# Patient Record
Sex: FEMALE | Race: WHITE | NOT HISPANIC OR LATINO | Employment: OTHER | URBAN - METROPOLITAN AREA
[De-identification: names, ages, dates, MRNs, and addresses within clinical notes are randomized per-mention and may not be internally consistent; named-entity substitution may affect disease eponyms.]

---

## 2024-07-30 ENCOUNTER — OFFICE VISIT (OUTPATIENT)
Dept: ORTHOPEDIC SURGERY | Facility: CLINIC | Age: 54
End: 2024-07-30
Payer: COMMERCIAL

## 2024-07-30 DIAGNOSIS — M16.11 PRIMARY OSTEOARTHRITIS OF RIGHT HIP: Primary | ICD-10-CM

## 2024-07-30 DIAGNOSIS — M25.551 RIGHT HIP PAIN: Primary | ICD-10-CM

## 2024-07-30 PROCEDURE — 99213 OFFICE O/P EST LOW 20 MIN: CPT | Performed by: ORTHOPAEDIC SURGERY

## 2024-07-30 RX ORDER — ACETAMINOPHEN 500 MG
1 TABLET ORAL
COMMUNITY
Start: 2024-01-24

## 2024-07-30 RX ORDER — BISACODYL 5 MG/1
1 TABLET, COATED ORAL
COMMUNITY

## 2024-07-30 RX ORDER — MELOXICAM 15 MG/1
1 TABLET ORAL
COMMUNITY
Start: 2024-02-19

## 2024-07-30 RX ORDER — FLUTICASONE PROPIONATE 50 MCG
2 SPRAY, SUSPENSION (ML) NASAL
COMMUNITY
Start: 2023-09-13

## 2024-07-30 RX ORDER — CELECOXIB 200 MG/1
200 CAPSULE ORAL 2 TIMES DAILY
Qty: 60 CAPSULE | Refills: 0 | Status: SHIPPED | OUTPATIENT
Start: 2024-07-30 | End: 2024-08-29

## 2024-07-30 RX ORDER — METOCLOPRAMIDE 10 MG/1
TABLET ORAL
COMMUNITY
Start: 2023-11-18

## 2024-07-30 RX ORDER — LEVOTHYROXINE SODIUM 112 UG/1
112 TABLET ORAL
COMMUNITY
Start: 2024-02-22

## 2024-07-30 RX ORDER — DICLOFENAC SODIUM 75 MG/1
75 TABLET, DELAYED RELEASE ORAL
COMMUNITY
Start: 2024-04-03

## 2024-07-30 RX ORDER — ESCITALOPRAM OXALATE 5 MG/1
7.5 TABLET ORAL
COMMUNITY
Start: 2023-11-21

## 2024-07-30 NOTE — PROGRESS NOTES
History of Present Illness  No chief complaint on file.      Patient presents with {side:94680} hip pain for several years.  It has been worsening over the past  {months:85299} months.  The patient localizes the pain predominantly in the {hip location:54944}.  Recently there has been concern for falls and instability.  There is increasing difficulty with activities of daily living and significant disability related to the hip pain.  The patient endorses the following failed non-operative treatments: {treatment options:61033}.   There is increasing frustration with persistent pain and discomfort and decreasing distance of ambulation.    Pain is described as {pain description:08355}.  Better with rest, worse with activity.   Pain level: {PAIN SCALE NUMBERS:29672}  Assistive device: {amb assistive device:15683}  History of surgery on the hip: ***  Back pain: {YES (DEF)/NO:63086}  Numbness or tingling radiating to the lower extremities: {YES (DEF)/NO:96791}    No past medical history on file.    Medication Documentation Review Audit    **Prior to Admission medications have not yet been reviewed**         Not on File    Social History     Socioeconomic History    Marital status:      Spouse name: Not on file    Number of children: Not on file    Years of education: Not on file    Highest education level: Not on file   Occupational History    Not on file   Tobacco Use    Smoking status: Not on file    Smokeless tobacco: Not on file   Substance and Sexual Activity    Alcohol use: Not on file    Drug use: Not on file    Sexual activity: Not on file   Other Topics Concern    Not on file   Social History Narrative    Not on file     Social Determinants of Health     Financial Resource Strain: Low Risk  (4/2/2023)    Received from [facility]    Overall Financial Resource Strain (CARDIA)     Difficulty of Paying Living Expenses: Not hard at all   Food Insecurity: No Food Insecurity (4/2/2023)    Received from  [facility]    Hunger Vital Sign     Worried About Running Out of Food in the Last Year: Never true     Ran Out of Food in the Last Year: Never true   Transportation Needs: No Transportation Needs (4/2/2023)    Received from [facility]    PRAPARE - Transportation     Lack of Transportation (Medical): No     Lack of Transportation (Non-Medical): No   Physical Activity: Insufficiently Active (4/2/2023)    Received from [facility]    Exercise Vital Sign     Days of Exercise per Week: 3 days     Minutes of Exercise per Session: 20 min   Stress: No Stress Concern Present (4/2/2023)    Received from [facility]    [place] of [employer]ational [employer] - Occupational Stress Questionnaire     Feeling of Stress : Only a little   Social Connections: Moderately Integrated (4/2/2023)    Received from [facility]    Social Connection and Isolation Panel [NHANES]     Frequency of Communication with Friends and Family: Twice a week     Frequency of Social Gatherings with Friends and Family: Once a week     Attends Jew Services: Never     Active Member of Clubs or Organizations: Yes     Attends Club or Organization Meetings: More than 4 times per year     Marital Status:    Intimate Partner Violence: Not on file   Housing Stability: Not on file       No past surgical history on file.    No images are attached to the encounter.    BMI  ***       Review of Systems   GENERAL: Negative for malaise, significant weight loss, fever  MUSCULOSKELETAL: see HPI  NEURO:  Negative     Exam  {side:73159} Hip:  Antalgic gait  Negative Trendelenburg sign  Skin healthy and intact  No tenderness to palpation over lumbar spine  Minimal tenderness over greater trochanter     Range of motion:  Flexion: {hip flexion:53759} internal rotation: {hip internal rotation:04756} external rotation: {hip external rotation:95088} abduction: {hip abduction:47670}  Pain with: {HIP EXAM POSITIVES:19309}  No weakness with  resisted hip flexion, abduction or adduction     Negative straight leg raise  Neurovascular exam normal distally     Radiographs  XR hip w pelvis  Interpreted By:  DIDI HORTON MD  MRN: [id number]  Patient Name: SERGIO LANE     STUDY:  HIP, UNILATERAL W/PELVIS WHEN PERFORMED 2-3 VIEWS;  Right;  4/4/2023  10:55 am     INDICATION:  pain  M25.551: Pain of right hip joint.     ACCESSION NUMBER(S):  [id number]     ORDERING CLINICIAN:  DIDI HORTON     FINDINGS:  Right hip films are negative fracture, dislocation or destructive  lesion. There is moderate degenerative arthrosis seen with joint  space narrowing and spurring. There is severe degenerative change  seen at L5-S1            Assessment  Osteoarthrosis {side:61147} hip     Plan  We discussed with the patient the diagnosis of {MILD, MOD, SEV:07231} degenerative joint disease of the hip.  We reviewed an evidence-based approach to osteoarthritis of the hip.  We strongly encouraged low-impact aerobic activity and non-opioid analgesics.  We discussed the role of physical therapy to aid in strengthening and gait training.  We discussed temporary pain relief with corticosteroid injections and the associated risks.      The patient elected for ***.

## 2024-07-31 NOTE — PROGRESS NOTES
Chief Complaint   Patient presents with    Right Hip - Follow-up       The patient is here for follow-up of their side: right hip arthritis.  They complain of moderate hip pain.  Thus far the patient has tried NSAIDS and Injections.  The patient denies any recent trauma.  The patient denies any back pain, numbness or tingling in the lower extremity.    History reviewed. No pertinent past medical history.    Medication Documentation Review Audit       Reviewed by Victoria Nolan on 07/30/24 at 1031      Medication Order Taking? Sig Documenting Provider Last Dose Status   diclofenac (Voltaren) 75 mg EC tablet [id number] Yes Take 1 tablet (75 mg) by mouth 2 times daily (morning and late afternoon). Historical Provider, MD  Active   escitalopram (Lexapro) 5 mg tablet [id number] Yes Take 1.5 tablets (7.5 mg) by mouth once daily. Historical Provider, MD  Active   fluticasone (Flonase) 50 mcg/actuation nasal spray [id number] Yes 2 sprays by Does not apply route once daily. Historical Provider, MD  Active   levothyroxine (Synthroid, Levoxyl) 112 mcg tablet [id number] Yes Take 1 tablet (112 mcg) by mouth once daily. Historical Provider, MD  Active   meloxicam (Mobic) 15 mg tablet [id number] Yes Take 1 tablet (15 mg) by mouth early in the morning.. Historical Provider, MD  Active   metoclopramide (Reglan) 10 mg tablet [id number] Yes Take 1 tablet by mouth every 6 hours as needed (headaches) for up to 7 days. Historical Provider, MD  Active   multivitamin with minerals iron-free (Multivitamin 50 Plus) [id number]  Take 1 tablet by mouth once daily. Historical Provider, MD  Active                    No Known Allergies    Social History     Socioeconomic History    Marital status:      Spouse name: Not on file    Number of children: Not on file    Years of education: Not on file    Highest education level: Not on file   Occupational History    Not on file   Tobacco Use    Smoking status: Former     Types: Cigarettes      Passive exposure: Past    Smokeless tobacco: Never   Substance and Sexual Activity    Alcohol use: Not on file    Drug use: Not on file    Sexual activity: Not on file   Other Topics Concern    Not on file   Social History Narrative    Not on file     Social Determinants of Health     Financial Resource Strain: Low Risk  (4/2/2023)    Received from [facility]    Overall Financial Resource Strain (CARDIA)     Difficulty of Paying Living Expenses: Not hard at all   Food Insecurity: No Food Insecurity (4/2/2023)    Received from [facility]    Hunger Vital Sign     Worried About Running Out of Food in the Last Year: Never true     Ran Out of Food in the Last Year: Never true   Transportation Needs: No Transportation Needs (4/2/2023)    Received from [facility]    PRAPARE - Transportation     Lack of Transportation (Medical): No     Lack of Transportation (Non-Medical): No   Physical Activity: Insufficiently Active (4/2/2023)    Received from [facility]    Exercise Vital Sign     Days of Exercise per Week: 3 days     Minutes of Exercise per Session: 20 min   Stress: No Stress Concern Present (4/2/2023)    Received from [facility]    [place] [employer] of Occupational Health - Occupational Stress Questionnaire     Feeling of Stress : Only a little   Social Connections: Moderately Integrated (4/2/2023)    Received from [facility]    Social Connection and Isolation Panel [NHANES]     Frequency of Communication with Friends and Family: Twice a week     Frequency of Social Gatherings with Friends and Family: Once a week     Attends Congregational Services: Never     Active Member of Clubs or Organizations: Yes     Attends Club or Organization Meetings: More than 4 times per year     Marital Status:    Intimate Partner Violence: Not on file   Housing Stability: Not on file       History reviewed. No pertinent surgical history.        Physical examination side: right hip:    There is  moderate pain with hip flexion, internal and external rotation.  The patient has intact hip flexion and hip abduction.  Hip range of motion:  Flexion: 120  Internal rotation: 10  External rotation: 20  Abduction: 30  The patient is distally neurovascularly intact    Imaging:  XR hip w pelvis  Interpreted By:  DIDI HORTON MD  MRN: [id number]  Patient Name: SERGIO LANE     STUDY:  HIP, UNILATERAL W/PELVIS WHEN PERFORMED 2-3 VIEWS;  Right;  4/4/2023  10:55 am     INDICATION:  pain  M25.551: Pain of right hip joint.     ACCESSION NUMBER(S):  [id number]     ORDERING CLINICIAN:  DIDI HORTON     FINDINGS:  Right hip films are negative fracture, dislocation or destructive  lesion. There is moderate degenerative arthrosis seen with joint  space narrowing and spurring. There is severe degenerative change  seen at L5-S1         Impression:  Osteoarthrosis side: right hip    Plan:  NSAIDS  I had a lengthy discussion with the patient about her options including hip replacement.  At this point she is not yet ready.  She has tried multiple anti-inflammatory medications without success.  She has had a corticosteroid injection intra-articularly which did not work well.  We will try Celebrex.  This was sent to her pharmacy.  Follow-up as needed  All questions were answered

## 2024-08-01 ENCOUNTER — APPOINTMENT (OUTPATIENT)
Dept: ORTHOPEDIC SURGERY | Facility: CLINIC | Age: 54
End: 2024-08-01
Payer: COMMERCIAL

## 2024-08-05 DIAGNOSIS — M16.11 PRIMARY OSTEOARTHRITIS OF RIGHT HIP: ICD-10-CM

## 2024-08-05 NOTE — TELEPHONE ENCOUNTER
Patient called and stated the Celebrex was helping, however it was starting to bother her stomach.  She is questing Celebrex 100mg to see if she tolerates it better.  Pharm verified. RX sent for review.

## 2024-08-06 RX ORDER — CELECOXIB 100 MG/1
100 CAPSULE ORAL 2 TIMES DAILY
Qty: 60 CAPSULE | Refills: 0 | Status: SHIPPED | OUTPATIENT
Start: 2024-08-06 | End: 2024-09-05

## 2024-09-09 DIAGNOSIS — M16.11 PRIMARY OSTEOARTHRITIS OF RIGHT HIP: ICD-10-CM

## 2024-09-09 RX ORDER — CELECOXIB 100 MG/1
100 CAPSULE ORAL 2 TIMES DAILY
Qty: 120 CAPSULE | Refills: 0 | Status: SHIPPED | OUTPATIENT
Start: 2024-09-09 | End: 2024-11-08

## 2024-09-30 NOTE — PROGRESS NOTES
No chief complaint on file.      The patient is here for follow-up of their side: right hip arthritis.  They complain of mild hip pain.  Thus far the patient has tried NSAIDS.  The patient denies any recent trauma.  The patient denies any back pain, numbness or tingling in the lower extremity.  She notes improvements in her hip pain with taking Celebrex.    No past medical history on file.    Medication Documentation Review Audit       Reviewed by Victoria Nolan on 07/30/24 at 1031      Medication Order Taking? Sig Documenting Provider Last Dose Status   diclofenac (Voltaren) 75 mg EC tablet [id number] Yes Take 1 tablet (75 mg) by mouth 2 times daily (morning and late afternoon). Historical Provider, MD  Active   escitalopram (Lexapro) 5 mg tablet [id number] Yes Take 1.5 tablets (7.5 mg) by mouth once daily. Historical Provider, MD  Active   fluticasone (Flonase) 50 mcg/actuation nasal spray [id number] Yes 2 sprays by Does not apply route once daily. Historical Provider, MD  Active   levothyroxine (Synthroid, Levoxyl) 112 mcg tablet [id number] Yes Take 1 tablet (112 mcg) by mouth once daily. Historical Provider, MD  Active   meloxicam (Mobic) 15 mg tablet [id number] Yes Take 1 tablet (15 mg) by mouth early in the morning.. Historical Provider, MD  Active   metoclopramide (Reglan) 10 mg tablet [id number] Yes Take 1 tablet by mouth every 6 hours as needed (headaches) for up to 7 days. Historical Provider, MD  Active   multivitamin with minerals iron-free (Multivitamin 50 Plus) [id number]  Take 1 tablet by mouth once daily. Historical Provider, MD  Active                    No Known Allergies    Social History     Socioeconomic History    Marital status:      Spouse name: Not on file    Number of children: Not on file    Years of education: Not on file    Highest education level: Not on file   Occupational History    Not on file   Tobacco Use    Smoking status: Former     Types: Cigarettes     Passive exposure:  Past    Smokeless tobacco: Never   Substance and Sexual Activity    Alcohol use: Not on file    Drug use: Not on file    Sexual activity: Not on file   Other Topics Concern    Not on file   Social History Narrative    Not on file     Social Determinants of Health     Financial Resource Strain: Low Risk  (4/2/2023)    Received from [facility]    Overall Financial Resource Strain (CARDIA)     Difficulty of Paying Living Expenses: Not hard at all   Food Insecurity: No Food Insecurity (4/2/2023)    Received from [facility]    Hunger Vital Sign     Worried About Running Out of Food in the Last Year: Never true     Ran Out of Food in the Last Year: Never true   Transportation Needs: No Transportation Needs (4/2/2023)    Received from [facility]    PRAPARE - Transportation     Lack of Transportation (Medical): No     Lack of Transportation (Non-Medical): No   Physical Activity: Insufficiently Active (4/2/2023)    Received from [facility]    Exercise Vital Sign     Days of Exercise per Week: 3 days     Minutes of Exercise per Session: 20 min   Stress: No Stress Concern Present (4/2/2023)    Received from [facility]    [place] of Occupational Health - Occupational Stress Questionnaire     Feeling of Stress : Only a little   Social Connections: Moderately Integrated (4/2/2023)    Received from [facility]    Social Connection and Isolation Panel [NHANES]     Frequency of Communication with Friends and Family: Twice a week     Frequency of Social Gatherings with Friends and Family: Once a week     Attends Judaism Services: Never     Active Member of Clubs or Organizations: Yes     Attends Club or Organization Meetings: More than 4 times per year     Marital Status:    Intimate Partner Violence: Not on file   Housing Stability: Not on file       No past surgical history on file.    BMI  31    Physical examination side: right hip:    There is mild pain with hip flexion,  internal and external rotation.  The patient has intact hip flexion and hip abduction.  Hip range of motion:  Flexion: 120  Internal rotation: 10  External rotation: 20  Abduction: 20  The patient is distally neurovascularly intact    Imaging:  XR hip w pelvis  Interpreted By:  DIDI HORTON MD  MRN: [id number]  Patient Name: SERGIO LANE     STUDY:  HIP, UNILATERAL W/PELVIS WHEN PERFORMED 2-3 VIEWS;  Right;  4/4/2023  10:55 am     INDICATION:  pain  M25.551: Pain of right hip joint.     ACCESSION NUMBER(S):  [id number]     ORDERING CLINICIAN:  DIDI HORTON     FINDINGS:  Right hip films are negative fracture, dislocation or destructive  lesion. There is moderate degenerative arthrosis seen with joint  space narrowing and spurring. There is severe degenerative change  seen at L5-S1         Impression:  Osteoarthrosis side: right hip    Plan:  Continue Celebrex, she will get refills through her primary care physician if needed.  If her hip pain becomes unbearable between refills of Celebrex she will call our office to be set up for an ultrasound-guided right hip intra-articular corticosteroid injection.  All questions answered

## 2024-10-01 ENCOUNTER — APPOINTMENT (OUTPATIENT)
Dept: ORTHOPEDIC SURGERY | Facility: CLINIC | Age: 54
End: 2024-10-01
Payer: COMMERCIAL

## 2024-10-01 VITALS — HEIGHT: 67 IN | BODY MASS INDEX: 31.23 KG/M2 | WEIGHT: 199 LBS

## 2024-10-01 DIAGNOSIS — M16.11 PRIMARY OSTEOARTHRITIS OF RIGHT HIP: Primary | ICD-10-CM

## 2024-10-01 PROCEDURE — 99213 OFFICE O/P EST LOW 20 MIN: CPT | Performed by: ORTHOPAEDIC SURGERY

## 2024-10-01 PROCEDURE — 3008F BODY MASS INDEX DOCD: CPT | Performed by: ORTHOPAEDIC SURGERY

## 2024-10-01 PROCEDURE — 1036F TOBACCO NON-USER: CPT | Performed by: ORTHOPAEDIC SURGERY

## 2024-10-01 RX ORDER — CELECOXIB 200 MG/1
200 CAPSULE ORAL DAILY
Qty: 60 CAPSULE | Refills: 3 | Status: SHIPPED | OUTPATIENT
Start: 2024-10-01 | End: 2025-05-29

## 2024-11-12 DIAGNOSIS — M16.11 PRIMARY OSTEOARTHRITIS OF RIGHT HIP: ICD-10-CM

## 2024-11-12 NOTE — TELEPHONE ENCOUNTER
Patient called and requested a refill for the Celebrex 100mg, BID.  She has a follow up in Jan 2025.  RX pended for review.

## 2024-11-19 RX ORDER — CELECOXIB 100 MG/1
100 CAPSULE ORAL 2 TIMES DAILY
Qty: 120 CAPSULE | Refills: 0 | Status: SHIPPED | OUTPATIENT
Start: 2024-11-19 | End: 2025-01-18

## 2024-11-26 ENCOUNTER — HOSPITAL ENCOUNTER (OUTPATIENT)
Dept: RADIOLOGY | Facility: HOSPITAL | Age: 54
Discharge: HOME | End: 2024-11-26
Payer: COMMERCIAL

## 2024-11-26 ENCOUNTER — APPOINTMENT (OUTPATIENT)
Dept: ORTHOPEDIC SURGERY | Facility: CLINIC | Age: 54
End: 2024-11-26
Payer: COMMERCIAL

## 2024-11-26 DIAGNOSIS — M25.551 PAIN OF RIGHT HIP: ICD-10-CM

## 2024-11-26 PROCEDURE — 73502 X-RAY EXAM HIP UNI 2-3 VIEWS: CPT | Mod: RIGHT SIDE | Performed by: RADIOLOGY

## 2024-11-26 PROCEDURE — 99214 OFFICE O/P EST MOD 30 MIN: CPT | Performed by: ORTHOPAEDIC SURGERY

## 2024-11-26 PROCEDURE — 73502 X-RAY EXAM HIP UNI 2-3 VIEWS: CPT | Mod: RT

## 2024-11-26 NOTE — PROGRESS NOTES
No chief complaint on file.      The patient is here for follow-up of their side: right hip arthritis.  They complain of severe hip pain.  Thus far the patient has tried NSAIDS and Injections.  The patient denies any recent trauma.  The patient denies any back pain, numbness or tingling in the lower extremity.    No past medical history on file.    Medication Documentation Review Audit       Reviewed by Victoria Nolan on 10/01/24 at 1010      Medication Order Taking? Sig Documenting Provider Last Dose Status   celecoxib (CeleBREX) 100 mg capsule [id number]  Take 1 capsule (100 mg) by mouth 2 times a day. Rey Ivey MD  Active   cholecalciferol (Vitamin D-3) 50 mcg (2,000 unit) capsule [id number]  Take 1 capsule (50 mcg) by mouth once daily with breakfast. Historical Provider, MD  Active   diclofenac (Voltaren) 75 mg EC tablet [id number]  Take 1 tablet (75 mg) by mouth 2 times daily (morning and late afternoon). Historical Provider, MD  Active   escitalopram (Lexapro) 5 mg tablet [id number]  Take 1.5 tablets (7.5 mg) by mouth once daily. Historical Provider, MD  Active   fluticasone (Flonase) 50 mcg/actuation nasal spray [id number]  2 sprays by Does not apply route once daily. Historical Provider, MD  Active   levothyroxine (Synthroid, Levoxyl) 112 mcg tablet [id number]  Take 1 tablet (112 mcg) by mouth once daily. Historical Provider, MD  Active   meloxicam (Mobic) 15 mg tablet [id number]  Take 1 tablet (15 mg) by mouth early in the morning.. Historical Provider, MD  Active   metoclopramide (Reglan) 10 mg tablet [id number]  Take 1 tablet by mouth every 6 hours as needed (headaches) for up to 7 days. Historical Provider, MD  Active   multivitamin with minerals iron-free (Multivitamin 50 Plus) [id number]  Take 1 tablet by mouth once daily. Historical Provider, MD  Active                    No Known Allergies    Social History     Socioeconomic History    Marital status:      Spouse name: Not on file    Number of  children: Not on file    Years of education: Not on file    Highest education level: Not on file   Occupational History    Not on file   Tobacco Use    Smoking status: Former     Types: Cigarettes     Passive exposure: Past    Smokeless tobacco: Never   Substance and Sexual Activity    Alcohol use: Not on file    Drug use: Not on file    Sexual activity: Not on file   Other Topics Concern    Not on file   Social History Narrative    Not on file     Social Drivers of Health     Financial Resource Strain: Low Risk  (4/2/2023)    Received from [facility]    Overall Financial Resource Strain (CARDIA)     Difficulty of Paying Living Expenses: Not hard at all   Food Insecurity: No Food Insecurity (4/2/2023)    Received from [facility]    Hunger Vital Sign     Worried About Running Out of Food in the Last Year: Never true     Ran Out of Food in the Last Year: Never true   Transportation Needs: No Transportation Needs (4/2/2023)    Received from [facility]    PRAPARE - Transportation     Lack of Transportation (Medical): No     Lack of Transportation (Non-Medical): No   Physical Activity: Insufficiently Active (4/2/2023)    Received from [facility]    Exercise Vital Sign     Days of Exercise per Week: 3 days     Minutes of Exercise per Session: 20 min   Stress: No Stress Concern Present (4/2/2023)    Received from [facility]    [place] of Occupational Health - Occupational Stress Questionnaire     Feeling of Stress : Only a little   Social Connections: Moderately Integrated (4/2/2023)    Received from [facility]    Social Connection and Isolation Panel [NHANES]     Frequency of Communication with Friends and Family: Twice a week     Frequency of Social Gatherings with Friends and Family: Once a week     Attends Jehovah's witness Services: Never     Active Member of Clubs or Organizations: Yes     Attends Club or Organization Meetings: More than 4 times per year     Marital Status:     Intimate Partner Violence: Not on file   Housing Stability: Not on file       No past surgical history on file.    BMI  31    Physical examination side: right hip:    There is severe pain with hip flexion, internal and external rotation.  The patient has intact hip flexion and hip abduction.  Hip range of motion:  Flexion: 120  Internal rotation: 0  External rotation: 20  Abduction: 20  The patient is distally neurovascularly intact    Imaging:  See dictated report      Impression:  Osteoarthrosis side: right hip    Plan:  The patient has failed to improve with multiple non-operative modalities.  There is increasing difficulty with activities of daily living and concern for falls.  The patient is endorsing severe pain and disability.       We had a lengthy discussion regarding total hip arthroplasty including the orthopaedic risks, including but not limited to, instability, infection, hematoma, early aseptic loosening, neurologic or vascular injury, clicking, limb length inequality and incomplete relief of pain.  We reviewed the medical risks, including but not limited to, deep venous thrombosis, pulmonary embolism, and cardiovascular/pulmonary issues.     We discussed the anticipated longevity of the implants and potential for revision surgery.  We also discussed anticoagulation, rehabilitation goals, and the hospital course.  We discussed the likelihood of opioid analgesics and risks associated with them.  The next step is to obtain medical risk stratification and encouraged the pre-operative information seminar at the hospital.  We are happy to provide assistance and counseling if the patient has any additional concerns.

## 2024-12-05 PROBLEM — M16.11 UNILATERAL PRIMARY OSTEOARTHRITIS, RIGHT HIP: Status: ACTIVE | Noted: 2024-12-05

## 2025-01-09 DIAGNOSIS — M16.11 PRIMARY OSTEOARTHRITIS OF RIGHT HIP: ICD-10-CM

## 2025-01-09 RX ORDER — METHYLPREDNISOLONE 4 MG/1
TABLET ORAL
Qty: 21 TABLET | Refills: 0 | Status: SHIPPED | OUTPATIENT
Start: 2025-01-09 | End: 2025-01-15

## 2025-01-09 NOTE — PROGRESS NOTES
Patient called with increased pain and discomfort from her hip.   Dr. Ivey will send in a steroid pack.

## 2025-01-28 NOTE — H&P (VIEW-ONLY)
CPE    FEEL 'PRETTY GOOD'    IS ON SCHEDULE FOR R THR WITH DR HORTON 25    WORSENED R HIP PAIN - JOLENE IN GROIN PART OF THE HIP.  WORSE AS COMPARED TO 6 OR 12 MOS AGO  SHE STOPPED WALKING, STOPPED WALKING DOG.  DOES NOT CARRY LAUNDRY UP STAIRS BEFORE.    WORSENED XR ON 2024   NO KNOWN R HIP TRAUMA OR REASON FOR DISPARITY IN HIP ARTHRITIS     ALSO PAST 4 WEEKS HAS HAD INCREASED PAIN DOWN RLE TO SHIN WHICH MAY BE SCIATICA.  ALSO HAS SOME R SIN AREA NUMBNESS  SHE TOOK A STEROID COURSE  A FEW WEEKS AGO AND IT DID HELP SOME THE ACUTE PAIN, BUT STILL HAS SXS    WAS SEEN IN SPINE CLINIC ALSO FOR OPINION ON BACK VS. HIP CONTRIBUTING TO LEG SSX   DID TAKE 2 MUSCLE RELAXANT AT NIGHT AND DID HELP PAIN/SLEEP SOME .    NO ILLNESSES OR OTHER SXS TO AFFECT UPCOMING SURGERY     NO SOB, COUGH, OR WHEEZE  NO CP OR PALPS     NO GERD OR UGI SXS   NO DIARRHEA   NO CONSTIPATION WITH TAKING MIRALAX DAILY    RECENT LABS 1/15/25 WNL  CHOL NOW IS < 200    SHE DOES NOT KNOW IF SHE HAS ANY PREADMISSION TESTING  [place] [place]     SHE ASKS ABOUT BLOOD TESTING FOR HYPERCOAG  HER MOTHER HAD AN APPARENT UNPROVOKED DVT.  SISTER HAD DVT ALSO WHILE ON OCP    HAS SOME CHRONIC L KNEE PAIN TOO, DISCUSSED BEFORE AND IS NOW WEARING A KNEE BRACE.    ALSO , AS IN PAST HAS B ELBOW PAIN AND SORENESS TOO    SINUSES OK 2025  , BUT HAS SOME RAGWEED/HAYFEVER SXS AT CERTAIN TIMES OF YEAR   USING FLONASE AS NEEDED   HAS NOT USE ORAL ANTIHIST.     MOOD, NERVES , STRESS,  ANXIETY SHE REPORTS - INCREASED ANXIETY WITH MOTHER NOW IN ASSISTED LIVING  CURRENTLY TAKING LEXAPRO AT 7.5MG DAILY DOSE AND FEELS THAT IS DOING OK.        HISTORIES  FAMILY HISTORY    Problem Relation Age of Onset   • Diabetes Brother         2 brothers, 1 , he also had alcoholism   • Hypertension Father         and CHF   • Thyroid Father    • Breast Cancer Paternal Aunt    • None Sister    • Thyroid Mother         hypothyroid   • Arthritis Mother          BACK   • None Daughter    • None Son      PAST MEDICAL HISTORY   Diagnosis Date   • Anemia    • Endometriosis, site unspecified     Endometriosis   • Female infertility of unspecified origin     Female infertility (resolved -pregnant)   • History of abdominal hysterectomy 2014   • Hydronephrosis 2016   • Kidney stone 2016   • Kidney stone 2016   • Personal history of endometriosis 2012   • PITUITARY Cyst? [253.9] 2005    MRI.    • Pyogenic granuloma of skin and subcutaneous tissue 2007    left upper chest   • Unspecified hypothyroidism     Hypothyroidism   • Ureteral stone 2016   • Vitamin D deficiency 2008       PAST SURGICAL HISTORY   Procedure Laterality Date   •  DELIVERY ONLY  07    , low cervical x 2   • HYSTERECTOMY HX  2013    fibroids,  persistent bleeding: abdominal.    • LAPS ABD PRTM&OMENTUM DX W/WO SPEC BR/WA SPX  2000    Dr. Kobi Tom, [place]   endometriosis ???stage??   • LAPS ABD PRTM&OMENTUM DX W/WO SPEC BR/WA SPX  2003    D&C  ([facility])  Endometriosis, extensive, did not tx.   • LAPS ABD PRTM&OMENTUM DX W/WO SPEC BR/WA SPX  10/03    Dr. Ramirez, removed endometriosis   • PAST SURGICAL HISTORY OF  2003    wisdom teeth extracted   • SKIN BX, 1 LESION  2007    pyogenic granuloma-left upper chest       Social History     Tobacco Use   • Smoking status: Former     Types: Cigarettes   • Smokeless tobacco: Never   • Tobacco comments:     social only-MINIMAL   Substance Use Topics   • Alcohol use: Yes     Comment: occasionally-- 1 EVERY 3 MOS   • Drug use: No             ROS: 14+ POINT ROS NEGATIVE EXCEPT AS NOTED IN HPI           PE    R HIP DECREASED ROM AND PAIN ON ROM TESTING  L HIP NORMAL ROM BUT HAS SOME PAIN AT FULL ROM   MILD L MEDIAL AND LATERAL EPICONDYLAR TENDERNESS    GENERAL APPEARANCE: Well appearing, alert, in no acute distress, well-hydrated, well nourished.  SKIN: Skin color, texture, turgor normal, no  suspicious rashes or lesions  HEAD: No significant findings.   EYES: pupils equal, round, reactive to light, EOMI, and Fundoscopic exam benign  EARS: External ears normal, canals clear  NOSE/SINUSES: Nares normal. Septum midline.  OROPHARYNX: Lips, mucosa, and tongue normal, teeth and gums normal, oropharynx normal  NECK: Supple, full range of motion, no lymphadenopathy, normal thyroid, no carotid bruits, and no JVD   BACK: Back symmetric, Normal curvature, ROM normal, No CVAT.  LUNGS: Normal breath sounds, Clear to auscultation, No wheezes, No crackles   HEART: Normal PMI, Regular rate and rhythm, Normal heart sounds, S1 and S2, and No murmurs.  BREASTS:not examined.   ABDOMEN: Soft, Non-tender, No palpable masses, Normal bowel sounds, and No hepatosplenomegaly.   EXTREMITIES: R HIP PAIN upon range of motion of hip.  Normal, No deformities, No skin discoloration, No edema, and Normal pulses bilaterally.  Left knee with soft brace.  NEURO: Awake, alert and oriented x 3, Cranial nerves II-XII grossly intact, Reflexes symmetrical, Normal gait, and No involuntary motions.    A/P      (M16.11) Primary osteoarthritis of right hip  (primary encounter diagnosis)  (M25.551) Right hip pain  (Z01.818) Preop exam for internal medicine  Comment: Advanced DJD of right hip with chronic but increasing right hip pain.  Pain despite conservative measures and pain medications.  Plans for elective total hip replacement.  Tentative date February 26, 2025 with Dr. Ivey at [facility]  Plan: As above.  Patient is medically acceptable to proceed with planned orthopedic surgery with routine preoperative labs and preoperative evaluation.    (M54.31) Sciatica, right side  (M51.361) Degeneration of intervertebral disc of lumbar region with lower extremity pain  Comment: Patient also appears to have sciatic symptoms which cause pain to radiate further down the right leg than expected from hip pathology alone.   Recently seen in spine clinic.  Plan: Follow-up as needed and symptomatic care.    (Z82.49) Family history of deep venous thrombosis  Comment: No personal history of DVT.  Suggest routine perioperative DVT prophylaxis    (M25.562,  G89.29) Chronic pain of left knee  Comment: Likely related to come sensation for right hip arthritis and pain  Plan: Symptomatic care.  Knee brace.    (E03.9) Acquired hypothyroidism  (E06.3) Hypothyroidism due to Hashimoto's thyroiditis  Comment: Clinically euthyroid.  Recent thyroid TSH normal.  Plan: Continue same dose levothyroxine.    (E78.49) Other hyperlipidemia  Comment: Last lipids reviewed.  Plan: Diet controlled.    (E55.9) Vitamin D deficiency  Comment:   Plan: Vitamin D supplement.    (M25.521,  M25.522) Bilateral elbow joint pain  Comment: Chronic, not severe.  Plan: Symptomatic management.    (J30.1) Seasonal allergic rhinitis due to pollen  Comment: (T78.40XD) Allergy, subsequent encounter  Plan: fluticasone (FLONASE) 50 mcg/actuation nasal         spray  Plan: Symptomatic treatment.  Flonase as needed.    (F32.A) Depression, unspecified depression type  (F41.9) Anxiety  Comment: Symptoms adequately controlled with current dose of Lexapro.  Plan: escitalopram oxalate (LEXAPRO) 5 mg tablet        Continue same    (Z23) Encounter for immunization  Comment:   Plan: INFLUENZA VACCINE, AGE 6MO-64YR, TRIVALENT         (AFLURIA, FLULAVAL, FLUVIRIN, FLUZONE)

## 2025-01-30 ENCOUNTER — OFFICE VISIT (OUTPATIENT)
Dept: ORTHOPEDIC SURGERY | Facility: CLINIC | Age: 55
End: 2025-01-30
Payer: COMMERCIAL

## 2025-01-30 ENCOUNTER — HOSPITAL ENCOUNTER (OUTPATIENT)
Dept: RADIOLOGY | Facility: HOSPITAL | Age: 55
Discharge: HOME | End: 2025-01-30
Payer: COMMERCIAL

## 2025-01-30 DIAGNOSIS — M25.562 ACUTE PAIN OF LEFT KNEE: ICD-10-CM

## 2025-01-30 DIAGNOSIS — M23.92 INTERNAL DERANGEMENT OF LEFT KNEE: Primary | ICD-10-CM

## 2025-01-30 DIAGNOSIS — S83.242A TEAR OF MEDIAL MENISCUS OF LEFT KNEE, CURRENT, UNSPECIFIED TEAR TYPE, INITIAL ENCOUNTER: ICD-10-CM

## 2025-01-30 PROCEDURE — L1812 KO ELASTIC W/JOINTS PRE OTS: HCPCS | Performed by: STUDENT IN AN ORGANIZED HEALTH CARE EDUCATION/TRAINING PROGRAM

## 2025-01-30 PROCEDURE — 99213 OFFICE O/P EST LOW 20 MIN: CPT | Performed by: STUDENT IN AN ORGANIZED HEALTH CARE EDUCATION/TRAINING PROGRAM

## 2025-01-30 PROCEDURE — 73564 X-RAY EXAM KNEE 4 OR MORE: CPT | Mod: LT

## 2025-01-30 NOTE — PROGRESS NOTES
Acute Injury Established Patient Visit    HPI: Cindy is a 54 y.o.female who presents today with new complaints of left knee pain.  She has been having this pain for at least a week, but went to get up from sitting today and felt a pop.  She has pain medially.  She had some mild swelling.  She is now noticing some clicking.  Pain feels like it is deep in the knee.  She denies any numbness tingling.  She denies any radiation.  She has been trying some compression, cane, Celebrex that she takes for her right hip, and Tylenol.  She states that it hurts to go from sitting to standing and going up and down stairs.    Plan: For concern for internal derangement of the left knee, specifically medial meniscus tear given her exam findings of tenderness over the medial joint line, positive Gucci's localizing medially, and a trace joint effusion as well as decreased range of motion in flexion, we will obtain an MRI for further evaluation, place her in a hinged knee brace, continue Celebrex from home, ice, rest, and elevation.  Follow-up with results of the MRI.  If MRI is essentially negative, would favor that she has some mild patellofemoral osteoarthritis and patellofemoral syndrome, and would recommend starting some physical therapy.    Assessment:   Problem List Items Addressed This Visit    None  Visit Diagnoses       Internal derangement of left knee    -  Primary    Relevant Orders    MR knee left wo IV contrast    Knee Brace, Hinged    Acute pain of left knee        Relevant Orders    XR knee left 4+ views    Knee Brace, Hinged    Tear of medial meniscus of left knee, current, unspecified tear type, initial encounter        Relevant Orders    MR knee left wo IV contrast    Knee Brace, Hinged            Diagnostics: Reviewed all relevant imaging including x-ray, MRI, CT, and US.      Procedure:  Procedures    Physical Exam:  GENERAL:  No obvious acute distress.  NEURO:  Distally neurovascularly intact.  Sensation  intact to light touch.  Extremity: Left knee examination shows:  Skin is intact;  No erythema or warmth;  No edema or ecchymosis;  Trace joint effusion;  Can flex the left knee to 110 degrees;  Full extension at 0 degrees;  No pain over the patella;  POSITIVE patellar grind test;  Extremely TENDER over the medial joint line;  No pain over the lateral joint line;  No pain over the patellar or quadricep tendon;  No pain over the proximal tibia;  No pain over the popliteal fossa;  Negative valgus stress test;  Negative varus stress test;  POSITIVE Gucci's test medially with no instability;  Negative Gucci's test laterally with no instability;  Negative Lachman's test;  Patellar and quadricep mechanism intact;  Negative anterior and posterior drawer test;  Negative patellar apprehension test;  Distal pulses are palpable;  Neurovascularly intact; and  Walking with no significant antalgic gait.    Orders Placed This Encounter    Knee Brace, Hinged    XR knee left 4+ views    MR knee left wo IV contrast      At the conclusion of the visit there were no further questions by the patient/family regarding their plan of care.  Patient was instructed to call or return with any issues, questions, or concerns regarding their injury and/or treatment plan described above.     01/30/25 at 4:03 PM - Atul Gaspar,     Office: [phone]    This note was prepared using voice recognition software.  The details of this note are correct and have been reviewed, and corrected to the best of my ability.  Some grammatical errors may persist related to the Dragon software.

## 2025-02-05 ENCOUNTER — HOSPITAL ENCOUNTER (OUTPATIENT)
Dept: RADIOLOGY | Facility: HOSPITAL | Age: 55
Discharge: HOME | End: 2025-02-05
Payer: COMMERCIAL

## 2025-02-05 DIAGNOSIS — M23.92 INTERNAL DERANGEMENT OF LEFT KNEE: ICD-10-CM

## 2025-02-05 DIAGNOSIS — S83.242A TEAR OF MEDIAL MENISCUS OF LEFT KNEE, CURRENT, UNSPECIFIED TEAR TYPE, INITIAL ENCOUNTER: ICD-10-CM

## 2025-02-05 PROCEDURE — 73721 MRI JNT OF LWR EXTRE W/O DYE: CPT | Mod: LEFT SIDE | Performed by: RADIOLOGY

## 2025-02-05 PROCEDURE — 73721 MRI JNT OF LWR EXTRE W/O DYE: CPT | Mod: LT

## 2025-02-06 NOTE — PROGRESS NOTES
History of Present Illness   No chief complaint on file.      Patient returns today to review side: left Knee MRI.  The patient endorses persistent knee pain and persistent mechanical symptoms including locking, catching, and giving out.  These issues are refractory to multiple non-surgical treatments.    No past medical history on file.    Medication Documentation Review Audit       Reviewed by Victoria Nolan on 11/26/24 at 1026      Medication Order Taking? Sig Documenting Provider Last Dose Status   celecoxib (CeleBREX) 100 mg capsule [id number]  Take 1 capsule (100 mg) by mouth 2 times a day. Rey Ivey MD  Active   celecoxib (CeleBREX) 200 mg capsule [id number]  Take 1 capsule (200 mg) by mouth once daily. Rey Ivey MD  Active   cholecalciferol (Vitamin D-3) 50 mcg (2,000 unit) capsule [id number]  Take 1 capsule (50 mcg) by mouth once daily with breakfast. Historical MD Eric  Active   diclofenac (Voltaren) 75 mg EC tablet [id number]  Take 1 tablet (75 mg) by mouth 2 times daily (morning and late afternoon). Historical MD Eric  Active   escitalopram (Lexapro) 5 mg tablet [id number]  Take 1.5 tablets (7.5 mg) by mouth once daily. Historical MD Eric  Active   fluticasone (Flonase) 50 mcg/actuation nasal spray [id number]  2 sprays by Does not apply route once daily. Historical MD Eric  Active   levothyroxine (Synthroid, Levoxyl) 112 mcg tablet [id number]  Take 1 tablet (112 mcg) by mouth once daily. Sujatha Reyes MD  Active   meloxicam (Mobic) 15 mg tablet [id number]  Take 1 tablet (15 mg) by mouth early in the morning.. Sujatha Reyes MD  Active   metoclopramide (Reglan) 10 mg tablet [id number]  Take 1 tablet by mouth every 6 hours as needed (headaches) for up to 7 days. Sujatha Reyes MD  Active   multivitamin with minerals iron-free (Multivitamin 50 Plus) [id number]  Take 1 tablet by mouth once daily. Sujatha Reyes MD  Active                    No Known  Allergies    Social History     Socioeconomic History    Marital status:      Spouse name: Not on file    Number of children: Not on file    Years of education: Not on file    Highest education level: Not on file   Occupational History    Not on file   Tobacco Use    Smoking status: Former     Types: Cigarettes     Passive exposure: Past    Smokeless tobacco: Never   Substance and Sexual Activity    Alcohol use: Not on file    Drug use: Not on file    Sexual activity: Not on file   Other Topics Concern    Not on file   Social History Narrative    Not on file     Social Drivers of Health     Financial Resource Strain: Low Risk  (1/26/2025)    Received from [facility]    Overall Financial Resource Strain (CARDIA)     Difficulty of Paying Living Expenses: Not hard at all   Food Insecurity: No Food Insecurity (1/26/2025)    Received from [facility]    Hunger Vital Sign     Worried About Running Out of Food in the Last Year: Never true     Ran Out of Food in the Last Year: Never true   Transportation Needs: No Transportation Needs (1/26/2025)    Received from [facility]    PRAPARE - Transportation     Lack of Transportation (Medical): No     Lack of Transportation (Non-Medical): No   Physical Activity: Insufficiently Active (1/26/2025)    Received from [facility]    Exercise Vital Sign     Days of Exercise per Week: 3 days     Minutes of Exercise per Session: 30 min   Stress: No Stress Concern Present (1/26/2025)    Received from [facility]    [employer] - Occupational Stress Questionnaire     Feeling of Stress : Only a little   Social Connections: Moderately Isolated (1/26/2025)    Received from [facility]    Social Connection and Isolation Panel [NHANES]     Frequency of Communication with Friends and Family: Twice a week     Frequency of Social Gatherings with Friends and Family: Twice a week     Attends Scientology Services: Never     Active  Member of Clubs or Organizations: No     Attends Club or Organization Meetings: Never     Marital Status:    Intimate Partner Violence: Not on file   Housing Stability: Not on file       No past surgical history on file.      BMI  32     Review of Systems   GENERAL: Negative for malaise, significant weight loss, fever  MUSCULOSKELETAL: See HPI  NEURO:  Negative for numbness / tingling      Physical Exam  side: left Knee:  Skin healthy and intact  No gross varus or valgus alignment   Range of motion: no major deficits   Tenderness to palpation over joint line: medial  No laxity to valgus stress  No laxity to varus stress  Negative Lachman´s test  Negative anterior drawer test  Negative posterior drawer test  Positive Gucci´s test  Neurovascular exam normal distally     Imaging  MR knee left wo IV contrast  Narrative: Interpreted By:  Ankush Cuello and Ritchie Brandon   STUDY:  MRI of the left knee without contrast dated 2/5/2025.      INDICATION:  Signs/Symptoms:pain      COMPARISON:  Radiographs of the left knee 01/30/2025..      ACCESSION NUMBER(S):  [id number]      ORDERING CLINICIAN:  ALTAGRACIA RODRIGUEZ      TECHNIQUE:  Multiplanar multisequence MRI of the left knee was performed  without  intravenous contrast.      FINDINGS:  MUSCLES, TENDONS, AND LIGAMENTS:      The anterior cruciate ligament is intact.  The posterior cruciate  ligament is intact. The medial collateral ligament is intact. The  lateral collateral ligament complex is intact. The popliteus and  biceps femoris tendons, iliotibial band, and extensor mechanism are  intact.      MENISCI:      There is truncation of the inner free edge of the posterior horn of  the medial meniscus near the root ligament junction.  The lateral  meniscus is intact.      OSSEOUS STRUCTURES AND JOINTS:      No fracture or dislocation is evident.   There is mild hyaline  articular cartilage of the medial and lateral femorotibial joint  spaces without evidence of  full thickness cartilage defect. There is  thinning of the hyaline articular cartilage of the patellar apex with  some fissuring on the lateral facet. There is a small volume knee  joint effusion.      SOFT TISSUES:      No significant volume of fluid is evident in a popliteal cyst.      Impression: 1. Free margin radial tearing of the posterior horn of the medial  meniscus near the root ligament junction.  2. Mild degenerative change of the knee.      I personally reviewed the images/study and I agree with the findings  as stated by resident Cristino Murry. This study was interpreted  at [place], [place].      Signed by: Ankush Cuello 2/5/2025 1:40 PM  Dictation workstation:   [id number]         Assessment:    Patient with a side: left knee medial meniscal tear.  Chondromalacia left knee     Plan:  I reviewed the MRI with the patient.  She has upcoming hip replacement surgery on the contralateral side.  She would really like to proceed with this as would I.  Her pain is improving.  I would recommend a corticosteroid injection today to the knee.  She should continue wearing the hinged knee brace as well.  We would then proceed with her hip arthroplasty.  If her knee continues to bother her postoperatively then I would address it surgically then in her postoperative period for the total hip.  She is in agreement with the plan.    All questions answered.     L Inj/Asp: L knee on 2/7/2025 10:41 AM  Indications: pain  Details: 21 G needle, anterolateral approach  Medications: 1 mL lidocaine 10 mg/mL (1 %); 1 mL betamethasone acet,sod phos 6 mg/mL  Outcome: tolerated well, no immediate complications  Procedure, treatment alternatives, risks and benefits explained, specific risks discussed. Consent was given by the patient. Patient was prepped and draped in the usual sterile fashion.

## 2025-02-07 ENCOUNTER — OFFICE VISIT (OUTPATIENT)
Dept: ORTHOPEDIC SURGERY | Facility: CLINIC | Age: 55
End: 2025-02-07
Payer: COMMERCIAL

## 2025-02-07 DIAGNOSIS — M94.262 CHONDROMALACIA, KNEE, LEFT: ICD-10-CM

## 2025-02-07 DIAGNOSIS — S83.242A TEAR OF MEDIAL MENISCUS OF LEFT KNEE, CURRENT, UNSPECIFIED TEAR TYPE, INITIAL ENCOUNTER: Primary | ICD-10-CM

## 2025-02-07 PROCEDURE — 20610 DRAIN/INJ JOINT/BURSA W/O US: CPT | Mod: LT | Performed by: ORTHOPAEDIC SURGERY

## 2025-02-07 PROCEDURE — 99214 OFFICE O/P EST MOD 30 MIN: CPT | Mod: 25 | Performed by: ORTHOPAEDIC SURGERY

## 2025-02-07 PROCEDURE — 2500000004 HC RX 250 GENERAL PHARMACY W/ HCPCS (ALT 636 FOR OP/ED): Performed by: ORTHOPAEDIC SURGERY

## 2025-02-07 RX ORDER — BETAMETHASONE SODIUM PHOSPHATE AND BETAMETHASONE ACETATE 3; 3 MG/ML; MG/ML
1 INJECTION, SUSPENSION INTRA-ARTICULAR; INTRALESIONAL; INTRAMUSCULAR; SOFT TISSUE
Status: COMPLETED | OUTPATIENT
Start: 2025-02-07 | End: 2025-02-07

## 2025-02-07 RX ORDER — LIDOCAINE HYDROCHLORIDE 10 MG/ML
1 INJECTION, SOLUTION INFILTRATION; PERINEURAL
Status: COMPLETED | OUTPATIENT
Start: 2025-02-07 | End: 2025-02-07

## 2025-02-07 RX ADMIN — BETAMETHASONE SODIUM PHOSPHATE AND BETAMETHASONE ACETATE 1 ML: 3; 3 INJECTION, SUSPENSION INTRA-ARTICULAR; INTRALESIONAL; INTRAMUSCULAR at 10:41

## 2025-02-07 RX ADMIN — LIDOCAINE HYDROCHLORIDE 1 ML: 10 INJECTION, SOLUTION INFILTRATION; PERINEURAL at 10:41

## 2025-02-10 ENCOUNTER — APPOINTMENT (OUTPATIENT)
Dept: RADIOLOGY | Facility: HOSPITAL | Age: 55
End: 2025-02-10
Payer: COMMERCIAL

## 2025-02-11 ENCOUNTER — APPOINTMENT (OUTPATIENT)
Dept: ORTHOPEDIC SURGERY | Facility: CLINIC | Age: 55
End: 2025-02-11
Payer: COMMERCIAL

## 2025-02-12 ENCOUNTER — HOSPITAL ENCOUNTER (OUTPATIENT)
Dept: CARDIOLOGY | Facility: HOSPITAL | Age: 55
Discharge: HOME | End: 2025-02-12
Payer: COMMERCIAL

## 2025-02-12 ENCOUNTER — PRE-ADMISSION TESTING (OUTPATIENT)
Dept: PREADMISSION TESTING | Facility: HOSPITAL | Age: 55
End: 2025-02-12
Payer: COMMERCIAL

## 2025-02-12 VITALS
HEART RATE: 73 BPM | BODY MASS INDEX: 33.75 KG/M2 | WEIGHT: 210 LBS | SYSTOLIC BLOOD PRESSURE: 144 MMHG | RESPIRATION RATE: 18 BRPM | DIASTOLIC BLOOD PRESSURE: 88 MMHG | HEIGHT: 66 IN | OXYGEN SATURATION: 98 %

## 2025-02-12 DIAGNOSIS — M16.9 OSTEOARTHRITIS OF HIP: ICD-10-CM

## 2025-02-12 DIAGNOSIS — M16.11 PRIMARY OSTEOARTHRITIS OF RIGHT HIP: ICD-10-CM

## 2025-02-12 LAB
ALBUMIN SERPL BCP-MCNC: 4.2 G/DL (ref 3.4–5)
ALP SERPL-CCNC: 70 U/L (ref 33–110)
ALT SERPL W P-5'-P-CCNC: 17 U/L (ref 7–45)
ANION GAP SERPL CALC-SCNC: 12 MMOL/L (ref 10–20)
AST SERPL W P-5'-P-CCNC: 12 U/L (ref 9–39)
ATRIAL RATE: 71 BPM
BASOPHILS # BLD AUTO: 0.05 X10*3/UL (ref 0–0.1)
BASOPHILS NFR BLD AUTO: 0.5 %
BILIRUB SERPL-MCNC: 0.6 MG/DL (ref 0–1.2)
BUN SERPL-MCNC: 21 MG/DL (ref 6–23)
CALCIUM SERPL-MCNC: 9.4 MG/DL (ref 8.6–10.3)
CHLORIDE SERPL-SCNC: 102 MMOL/L (ref 98–107)
CO2 SERPL-SCNC: 29 MMOL/L (ref 21–32)
CREAT SERPL-MCNC: 0.6 MG/DL (ref 0.5–1.05)
EGFRCR SERPLBLD CKD-EPI 2021: >90 ML/MIN/1.73M*2
EOSINOPHIL # BLD AUTO: 0.13 X10*3/UL (ref 0–0.7)
EOSINOPHIL NFR BLD AUTO: 1.3 %
ERYTHROCYTE [DISTWIDTH] IN BLOOD BY AUTOMATED COUNT: 12.7 % (ref 11.5–14.5)
EST. AVERAGE GLUCOSE BLD GHB EST-MCNC: 114 MG/DL
GLUCOSE SERPL-MCNC: 86 MG/DL (ref 74–99)
HBA1C MFR BLD: 5.6 %
HCT VFR BLD AUTO: 45.1 % (ref 36–46)
HGB BLD-MCNC: 14.6 G/DL (ref 12–16)
IMM GRANULOCYTES # BLD AUTO: 0.03 X10*3/UL (ref 0–0.7)
IMM GRANULOCYTES NFR BLD AUTO: 0.3 % (ref 0–0.9)
INR PPP: 1 (ref 0.9–1.1)
LYMPHOCYTES # BLD AUTO: 2.97 X10*3/UL (ref 1.2–4.8)
LYMPHOCYTES NFR BLD AUTO: 30.8 %
MCH RBC QN AUTO: 29.9 PG (ref 26–34)
MCHC RBC AUTO-ENTMCNC: 32.4 G/DL (ref 32–36)
MCV RBC AUTO: 92 FL (ref 80–100)
MONOCYTES # BLD AUTO: 0.54 X10*3/UL (ref 0.1–1)
MONOCYTES NFR BLD AUTO: 5.6 %
NEUTROPHILS # BLD AUTO: 5.93 X10*3/UL (ref 1.2–7.7)
NEUTROPHILS NFR BLD AUTO: 61.5 %
NRBC BLD-RTO: 0 /100 WBCS (ref 0–0)
P AXIS: 40 DEGREES
P OFFSET: 196 MS
P ONSET: 144 MS
PLATELET # BLD AUTO: 210 X10*3/UL (ref 150–450)
POTASSIUM SERPL-SCNC: 4.5 MMOL/L (ref 3.5–5.3)
PR INTERVAL: 152 MS
PROT SERPL-MCNC: 7 G/DL (ref 6.4–8.2)
PROTHROMBIN TIME: 11.4 SECONDS (ref 9.8–12.8)
Q ONSET: 220 MS
QRS COUNT: 12 BEATS
QRS DURATION: 86 MS
QT INTERVAL: 384 MS
QTC CALCULATION(BAZETT): 417 MS
QTC FREDERICIA: 406 MS
R AXIS: 85 DEGREES
RBC # BLD AUTO: 4.88 X10*6/UL (ref 4–5.2)
SODIUM SERPL-SCNC: 138 MMOL/L (ref 136–145)
T AXIS: 49 DEGREES
T OFFSET: 412 MS
VENTRICULAR RATE: 71 BPM
WBC # BLD AUTO: 9.7 X10*3/UL (ref 4.4–11.3)

## 2025-02-12 PROCEDURE — 84075 ASSAY ALKALINE PHOSPHATASE: CPT

## 2025-02-12 PROCEDURE — 83036 HEMOGLOBIN GLYCOSYLATED A1C: CPT | Mod: ELYLAB

## 2025-02-12 PROCEDURE — 85025 COMPLETE CBC W/AUTO DIFF WBC: CPT

## 2025-02-12 PROCEDURE — 85610 PROTHROMBIN TIME: CPT

## 2025-02-12 PROCEDURE — 93005 ELECTROCARDIOGRAM TRACING: CPT

## 2025-02-12 PROCEDURE — 87081 CULTURE SCREEN ONLY: CPT | Mod: ELYLAB

## 2025-02-12 PROCEDURE — 36415 COLL VENOUS BLD VENIPUNCTURE: CPT

## 2025-02-12 NOTE — PREPROCEDURE INSTRUCTIONS
KNEE & HIP JOINT REPLACEMENT PRE-OPERATIVE INSTRUCTIONS     You will receive notification one business day prior to your surgery to confirm your arrival time and any additional information between 2 P.M. - 5 P.M. It is important that you answer your phone and/or check your messages during this time.     You may see in be2hart your surgery start time change several times even up to the day before your procedure. Please disregard those times and only follow the time given by the  who will be notifying you via phone and not a text.     Please arrive at your scheduled time to avoid delay or cancelled surgery.     Please enter the building through either the Main Entrance in front of the hospital or from the Parking Garage Walk Way Bridge. From the parking garage, which is free, take the 2nd Floor Walkway Bridge into the hospital and check in at the [facility] Outpatient Desk as you enter the hospital directly in front of you. If you enter through the Main Entrance take the elevator off the lobby on the right labeled “A” to the 2nd floor and check in at the [facility] Outpatient Surgery desk as you exit the elevator. Wheelchairs are available for use if using the Main Entrance.     Handicap parking in the land lot, in front of hospital by main entrance, wheelchairs are available at this entrance     ?   INSTRUCTIONS:   Please contact your doctor’s office, who is doing procedure, about any changes in your health, bad cold, fever, sore throat, or COVID within last 4 weeks     Talk to your surgeon for instructions if you should stop your aspirin, blood thinner, diabetes medicines, weight loss medications, multivitamins or over the counter supplements since many surgeons have you adjust or stop these medications prior to procedure. The doctor’s office may have you contact the prescribing doctor for medication adjustments for your surgery.     If you take certain medications like Beta Blocker or Anti-Seizure medication, you may have  to take them the morning of procedure with a sip of water. If this is the case your surgeons office should let you know, and the PAT nurses will follow up when they speak to you to verify you are aware.     If not staying overnight after your surgery, and you are receiving any type of anesthesia with your surgery, you must have an adult (age 18 or older) immediately available to drive you home after surgery or your procedure will be cancelled. You may be discharged home after surgery per an Uber, Lyft, Taxi or any other transportation service as long as the responsible adult (18 or older) is in the vehicle with you at time of discharge. The  of these transportation services is not responsible for your care and cannot be consider a responsible adult. We also highly recommend you have someone stay with you for the entire day and night of your surgery.     All jewelry and piercings must be removed. If you are unable to remove an item or have a dermal piercing, please be sure to tell the nurse when you arrive for surgery.     Nail polish must be removed off one finger of each hand     Make-up or other beauty products (lotion, deodorant, hairspray, perfume, etc.) must be removed or not used for day of surgery.     Avoid smoking, consuming alcohol, or any medical or recreational drug use for 24 hours before surgery.     Do not wear contacts to hospital, bring your glasses and a case     Leave valuables at home except photo ID, insurance card and any co-payment that has been requested by hospital.     For adults who are unable to consent or make medical decisions for themselves, a legal guardian or Power of  must accompany them to the surgery center. If this is not possible, please call [phone] to make additional arrangements.  If there is any guardianship or legal Power of  paperwork, please bring them the day of surgery.     Wear comfortable, loose fitting clothing into the procedure.  While  your admitted you are asked to bring short sleeve shirts, shorts (loose like pajamas, sweat shorts), and tennis shoes/sneakers.  No slip on shoes.     Please bring your 2-Wheeled Walker with you the day of surgery and the [facility] Booklet that was given to you during your PAT appointment.     The nurse practitioners, , , physical therapist, and occupational therapist will all discuss and work with you during your stay to help with discharge, physical therapy and any other needs. They also may discuss a program called Meds to Beds, where postoperative medications prescribed to you after surgery will be filled and delivered to your beside before you leave, so that you do not need to stop at the pharmacy on the way home    If you use a CPAP or BIPAP, please make sure the PAT nurse was able to get the settings from you, if not please inform the nurses the day of surgery of your settings you use at home.     Additional instructions about eating and drinking before surgery:     Do not eat any solid foods?or drink anything after midnight the night prior to your procedure. Milk, nutritional drinks/supplements, and infant formula are considered solid foods.  This also includes no gum, candy, lozenges, ice, or any other oral consumption.     CHG SOAP & ORAL RINSE   In regards to bathing, please follow the instructions explained to you at the PAT appointment about taking a showering with the CHG soap the 5 nights prior to your surgery.       During your PAT Appointment you were given [employer] CHG Wash Soap (bottles of bubble gum pink soap) to use before procedure.  Begin using the CHG soap 5 days before your scheduled surgery.  Be sure to sleep on clean sheets - change your sheets the first night you do this cleansing process (you don't not need to change them every night).     CHG SOAP INSTRUCTIONS:  Begin using the CHG soap 5 days prior to your scheduled surgery.  You will wash and  rinse your face and genitals with normal soap.  The night before surgery is the ONLY TIME you use the CHG SOAP for your hair, and do NOT use conditioner after washing with the CHG Soap.  For the rest of the showers the 5 days prior to surgery you will use normal shampoo.  Hair extensions should be removed.  Then apply CHG soap solution to a clean wet washcloth.     Turn the water off or move away from the water spray to avoid premature rinsing of the CHG soap as you apply it.   Avoid getting the CHG soap in your ears, eyes, and mouth.  Apply the CHG soap to entire body from the neck down EXCEPT your face and genitals.  Allow the CHG soap to sit for 3 minutes on your skin.  Then turn on water and rinse the CHG solution off your body completely.    DO NOT wash with regular soap after you have used the CHG soap   Pat yourself dry with a clean, fresh-laundered towel when you get out of the shower and clean Pj's  DO NOT apply any powders, deodorants, or lotions after shower   Be aware the CHG soap will stain fabrics if you wash them with bleach after use.   Make sure to pay special attention to cleaning area(s) where your incision(s) will be located. Avoid scrubbing your skin to hard.  You will repeat this same process steps 1-12 for each shower you take prior to surgery.  The morning of  the surgery is the fifth day.      ORAL RINSE   You will receive a call or notification from your pharmacy to  a prescription prior to procedure.  Be sure to  the prescription oral rinse from your local pharmacy.   You will be using the oral rinse the night before and the morning of surgery.    Take a cap full of the solution, 15mL   Swish (gargle if you can) the mouthwash in your mouth for at least 30 seconds, (DO NOT SWALLOW), and spit out   After the oral CHG rinse, do not rinse your mouth with water, drink, or eat.      If you have to take a medication the morning of surgery as instructed by your surgeon- please take  that medication with a sip of water prior to doing the oral rinse the day of surgery.       ?If you have any questions or concerns, please call Pre-Admission Testing at [phone] or your Physician’s office   Dr. Rey Ivey  [phone]  [facility] General Line: [phone]

## 2025-02-14 LAB — STAPHYLOCOCCUS SPEC CULT: NORMAL

## 2025-02-17 ENCOUNTER — TELEPHONE (OUTPATIENT)
Dept: ORTHOPEDIC SURGERY | Facility: CLINIC | Age: 55
End: 2025-02-17
Payer: COMMERCIAL

## 2025-02-18 DIAGNOSIS — Z01.818 PREOP EXAMINATION: Primary | ICD-10-CM

## 2025-02-18 RX ORDER — CHLORHEXIDINE GLUCONATE ORAL RINSE 1.2 MG/ML
15 SOLUTION DENTAL AS NEEDED
Qty: 120 ML | Refills: 0 | Status: SHIPPED | OUTPATIENT
Start: 2025-02-18 | End: 2025-03-04

## 2025-02-24 ENCOUNTER — APPOINTMENT (OUTPATIENT)
Dept: ORTHOPEDIC SURGERY | Facility: CLINIC | Age: 55
End: 2025-02-24
Payer: COMMERCIAL

## 2025-02-24 PROCEDURE — E0143 WALKER FOLDING WHEELED W/O S: HCPCS | Performed by: ORTHOPAEDIC SURGERY

## 2025-02-24 NOTE — DISCHARGE INSTRUCTIONS
Total Hip Replacement   Discharge Instructions    To prevent Clot formation, you have been placed on the following medication:  Eliquis loading dose pack for treatment of pulmonary embolism. Please take as directed on packaging.      Surgical Site Care:  Change dressing once a day and PRN (as needed). Apply 4 x 4 sponge and light tape. If glue present, leave open to air.  You may leave wound open to air after initial dressing removal, if wound is clean, dry and intact  If Aquacel Ag dressing is present, do not remove dressing for 7 days, unless heavily saturated. If heavily saturated, remove dressing and start using instructions above  Staples will be removed on post-operative day 14 and steri-strips applied  Showering is permitted starting POD1 if waterproof Aquacel dressing is present or when the incision is covered with 4 x 4 and Tegaderm waterproof dressing  Until all areas of incision are healed.    Physical Therapy:  Weight Bearing Status:  Weight bearing as tolerated  Anterior Hip precautions, per therapy handout   Pain Medications  You were given  oxycodone  Wean off pain medications as you deem appropriate as long as pain is under control  Cold packs/Ice packs/Machine  May be used 3 times daily for 15-30 minutes as necessary  Be sure to have a barrier (cloth, clothing, towel) between the site and the ice pack to prevent frostbite  Contact [facility] office if  Increased redness, swelling, drainage of any kind, and/or pain to surgery site.  As well as new onset fevers and or chills.  These could signify an infection.  Calf or thigh tenderness to touch as well as increased swelling or redness.  This could signify a clot formation.  Numbness or tingling to an area around the incision site or below the incision site (toes).  Any rash appears, increased  or new onset nausea/vomiting occur.  This may indicate a reaction to a medication.  Phone # [phone].  Follow up with Surgeon   I acknowledge  that I have received elia hose and understand the instructions on how and when to wear them (on during the day, off at night)   Discharging RN who has gone over instructions and acknowledges elia hose have been received    Ice 5 times a day for 20 minutes each session to operative extremity for two weeks.   ELIA hose to be worn for three weeks. Can be removed for skin care and hygiene.   Incentive spirometer 10 times every hour while awake for two weeks.

## 2025-02-25 ENCOUNTER — ANESTHESIA EVENT (OUTPATIENT)
Dept: OPERATING ROOM | Facility: HOSPITAL | Age: 55
End: 2025-02-25
Payer: COMMERCIAL

## 2025-02-25 PROCEDURE — RXMED WILLOW AMBULATORY MEDICATION CHARGE

## 2025-02-26 ENCOUNTER — APPOINTMENT (OUTPATIENT)
Dept: RADIOLOGY | Facility: HOSPITAL | Age: 55
DRG: 908 | End: 2025-02-26
Payer: COMMERCIAL

## 2025-02-26 ENCOUNTER — HOSPITAL ENCOUNTER (INPATIENT)
Facility: HOSPITAL | Age: 55
LOS: 4 days | Discharge: HOME | End: 2025-03-02
Attending: ORTHOPAEDIC SURGERY | Admitting: STUDENT IN AN ORGANIZED HEALTH CARE EDUCATION/TRAINING PROGRAM
Payer: COMMERCIAL

## 2025-02-26 ENCOUNTER — ANESTHESIA (OUTPATIENT)
Dept: OPERATING ROOM | Facility: HOSPITAL | Age: 55
End: 2025-02-26
Payer: COMMERCIAL

## 2025-02-26 DIAGNOSIS — M16.11 UNILATERAL PRIMARY OSTEOARTHRITIS, RIGHT HIP: ICD-10-CM

## 2025-02-26 DIAGNOSIS — I26.99 PULMONARY EMBOLISM ON RIGHT (MULTI): ICD-10-CM

## 2025-02-26 DIAGNOSIS — R57.8 HEMORRHAGIC SHOCK (MULTI): Primary | ICD-10-CM

## 2025-02-26 PROBLEM — E66.9 OBESITY: Status: ACTIVE | Noted: 2025-02-26

## 2025-02-26 PROBLEM — E03.9 HYPOTHYROIDISM: Status: ACTIVE | Noted: 2025-02-26

## 2025-02-26 LAB
ABO GROUP (TYPE) IN BLOOD: NORMAL
ABO GROUP (TYPE) IN BLOOD: NORMAL
ANION GAP BLDV CALCULATED.4IONS-SCNC: 4 MMOL/L (ref 10–25)
ANTIBODY SCREEN: NORMAL
APTT PPP: 25 SECONDS (ref 26–36)
BASE EXCESS BLDV CALC-SCNC: 2.4 MMOL/L (ref -2–3)
BASOPHILS # BLD AUTO: 0.02 X10*3/UL (ref 0–0.1)
BASOPHILS NFR BLD AUTO: 0.2 %
BLOOD EXPIRATION DATE: NORMAL
BLOOD EXPIRATION DATE: NORMAL
BODY TEMPERATURE: ABNORMAL
CA-I BLDV-SCNC: 1.15 MMOL/L (ref 1.1–1.33)
CHLORIDE BLDV-SCNC: 107 MMOL/L (ref 98–107)
DISPENSE STATUS: NORMAL
DISPENSE STATUS: NORMAL
EOSINOPHIL # BLD AUTO: 0.1 X10*3/UL (ref 0–0.7)
EOSINOPHIL NFR BLD AUTO: 1 %
ERYTHROCYTE [DISTWIDTH] IN BLOOD BY AUTOMATED COUNT: 13.1 % (ref 11.5–14.5)
ERYTHROCYTE [DISTWIDTH] IN BLOOD BY AUTOMATED COUNT: 13.4 % (ref 11.5–14.5)
ERYTHROCYTE [DISTWIDTH] IN BLOOD BY AUTOMATED COUNT: 13.6 % (ref 11.5–14.5)
GLUCOSE BLDV-MCNC: 102 MG/DL (ref 74–99)
HCO3 BLDV-SCNC: 28.7 MMOL/L (ref 22–26)
HCT VFR BLD AUTO: 29.1 % (ref 36–46)
HCT VFR BLD AUTO: 30.9 % (ref 36–46)
HCT VFR BLD AUTO: 32.6 % (ref 36–46)
HCT VFR BLD EST: 31 % (ref 36–46)
HGB BLD-MCNC: 10.5 G/DL (ref 12–16)
HGB BLD-MCNC: 10.8 G/DL (ref 12–16)
HGB BLD-MCNC: 9.9 G/DL (ref 12–16)
HGB BLDV-MCNC: 10.3 G/DL (ref 12–16)
HOLD SPECIMEN: NORMAL
HOLD SPECIMEN: NORMAL
IMM GRANULOCYTES # BLD AUTO: 0.05 X10*3/UL (ref 0–0.7)
IMM GRANULOCYTES NFR BLD AUTO: 0.5 % (ref 0–0.9)
INR PPP: 1.2 (ref 0.9–1.1)
LACTATE BLDV-SCNC: 1.9 MMOL/L (ref 0.4–2)
LYMPHOCYTES # BLD AUTO: 1.77 X10*3/UL (ref 1.2–4.8)
LYMPHOCYTES NFR BLD AUTO: 17.2 %
MCH RBC QN AUTO: 30.4 PG (ref 26–34)
MCH RBC QN AUTO: 30.4 PG (ref 26–34)
MCH RBC QN AUTO: 30.5 PG (ref 26–34)
MCHC RBC AUTO-ENTMCNC: 33.1 G/DL (ref 32–36)
MCHC RBC AUTO-ENTMCNC: 34 G/DL (ref 32–36)
MCHC RBC AUTO-ENTMCNC: 34 G/DL (ref 32–36)
MCV RBC AUTO: 90 FL (ref 80–100)
MCV RBC AUTO: 90 FL (ref 80–100)
MCV RBC AUTO: 92 FL (ref 80–100)
MONOCYTES # BLD AUTO: 0.56 X10*3/UL (ref 0.1–1)
MONOCYTES NFR BLD AUTO: 5.5 %
NEUTROPHILS # BLD AUTO: 7.77 X10*3/UL (ref 1.2–7.7)
NEUTROPHILS NFR BLD AUTO: 75.6 %
NRBC BLD-RTO: 0 /100 WBCS (ref 0–0)
OXYHGB MFR BLDV: 97.5 % (ref 45–75)
PCO2 BLDV: 52 MM HG (ref 41–51)
PH BLDV: 7.35 PH (ref 7.33–7.43)
PLATELET # BLD AUTO: 114 X10*3/UL (ref 150–450)
PLATELET # BLD AUTO: 124 X10*3/UL (ref 150–450)
PLATELET # BLD AUTO: 99 X10*3/UL (ref 150–450)
PO2 BLDV: 162 MM HG (ref 35–45)
POTASSIUM BLDV-SCNC: 3.7 MMOL/L (ref 3.5–5.3)
PRODUCT BLOOD TYPE: 5100
PRODUCT BLOOD TYPE: 5100
PRODUCT CODE: NORMAL
PRODUCT CODE: NORMAL
PROTHROMBIN TIME: 13.5 SECONDS (ref 9.8–12.4)
RBC # BLD AUTO: 3.25 X10*6/UL (ref 4–5.2)
RBC # BLD AUTO: 3.45 X10*6/UL (ref 4–5.2)
RBC # BLD AUTO: 3.55 X10*6/UL (ref 4–5.2)
RH FACTOR (ANTIGEN D): NORMAL
RH FACTOR (ANTIGEN D): NORMAL
SAO2 % BLDV: 100 % (ref 45–75)
SODIUM BLDV-SCNC: 136 MMOL/L (ref 136–145)
UNIT ABO: NORMAL
UNIT ABO: NORMAL
UNIT NUMBER: NORMAL
UNIT NUMBER: NORMAL
UNIT RH: NORMAL
UNIT RH: NORMAL
UNIT VOLUME: 319
UNIT VOLUME: 350
WBC # BLD AUTO: 10.3 X10*3/UL (ref 4.4–11.3)
WBC # BLD AUTO: 11.6 X10*3/UL (ref 4.4–11.3)
WBC # BLD AUTO: 8.8 X10*3/UL (ref 4.4–11.3)
XM INTEP: NORMAL
XM INTEP: NORMAL

## 2025-02-26 PROCEDURE — 99291 CRITICAL CARE FIRST HOUR: CPT

## 2025-02-26 PROCEDURE — 2500000004 HC RX 250 GENERAL PHARMACY W/ HCPCS (ALT 636 FOR OP/ED): Performed by: NURSE ANESTHETIST, CERTIFIED REGISTERED

## 2025-02-26 PROCEDURE — 2500000004 HC RX 250 GENERAL PHARMACY W/ HCPCS (ALT 636 FOR OP/ED)

## 2025-02-26 PROCEDURE — C1776 JOINT DEVICE (IMPLANTABLE): HCPCS | Performed by: ORTHOPAEDIC SURGERY

## 2025-02-26 PROCEDURE — 3600000018 HC OR TIME - INITIAL BASE CHARGE - PROCEDURE LEVEL SIX: Performed by: ORTHOPAEDIC SURGERY

## 2025-02-26 PROCEDURE — 72170 X-RAY EXAM OF PELVIS: CPT

## 2025-02-26 PROCEDURE — 85610 PROTHROMBIN TIME: CPT

## 2025-02-26 PROCEDURE — 2780000003 HC OR 278 NO HCPCS: Performed by: ORTHOPAEDIC SURGERY

## 2025-02-26 PROCEDURE — P9016 RBC LEUKOCYTES REDUCED: HCPCS

## 2025-02-26 PROCEDURE — C1713 ANCHOR/SCREW BN/BN,TIS/BN: HCPCS | Performed by: ORTHOPAEDIC SURGERY

## 2025-02-26 PROCEDURE — 82805 BLOOD GASES W/O2 SATURATION: CPT

## 2025-02-26 PROCEDURE — 2500000004 HC RX 250 GENERAL PHARMACY W/ HCPCS (ALT 636 FOR OP/ED): Performed by: STUDENT IN AN ORGANIZED HEALTH CARE EDUCATION/TRAINING PROGRAM

## 2025-02-26 PROCEDURE — 2500000005 HC RX 250 GENERAL PHARMACY W/O HCPCS: Performed by: PHYSICIAN ASSISTANT

## 2025-02-26 PROCEDURE — P9017 PLASMA 1 DONOR FRZ W/IN 8 HR: HCPCS

## 2025-02-26 PROCEDURE — 2500000001 HC RX 250 WO HCPCS SELF ADMINISTERED DRUGS (ALT 637 FOR MEDICARE OP): Performed by: PHYSICIAN ASSISTANT

## 2025-02-26 PROCEDURE — 27130 TOTAL HIP ARTHROPLASTY: CPT | Performed by: ORTHOPAEDIC SURGERY

## 2025-02-26 PROCEDURE — 7100000002 HC RECOVERY ROOM TIME - EACH INCREMENTAL 1 MINUTE: Performed by: ORTHOPAEDIC SURGERY

## 2025-02-26 PROCEDURE — 85027 COMPLETE CBC AUTOMATED: CPT

## 2025-02-26 PROCEDURE — 0SR902Z REPLACEMENT OF RIGHT HIP JOINT WITH METAL ON POLYETHYLENE SYNTHETIC SUBSTITUTE, OPEN APPROACH: ICD-10-PCS | Performed by: ORTHOPAEDIC SURGERY

## 2025-02-26 PROCEDURE — 3700000002 HC GENERAL ANESTHESIA TIME - EACH INCREMENTAL 1 MINUTE: Performed by: ORTHOPAEDIC SURGERY

## 2025-02-26 PROCEDURE — 2020000001 HC ICU ROOM DAILY

## 2025-02-26 PROCEDURE — 2500000005 HC RX 250 GENERAL PHARMACY W/O HCPCS: Performed by: ORTHOPAEDIC SURGERY

## 2025-02-26 PROCEDURE — 2500000004 HC RX 250 GENERAL PHARMACY W/ HCPCS (ALT 636 FOR OP/ED): Performed by: PHYSICIAN ASSISTANT

## 2025-02-26 PROCEDURE — 2500000001 HC RX 250 WO HCPCS SELF ADMINISTERED DRUGS (ALT 637 FOR MEDICARE OP)

## 2025-02-26 PROCEDURE — 27130 TOTAL HIP ARTHROPLASTY: CPT | Performed by: PHYSICIAN ASSISTANT

## 2025-02-26 PROCEDURE — 36430 TRANSFUSION BLD/BLD COMPNT: CPT

## 2025-02-26 PROCEDURE — 85730 THROMBOPLASTIN TIME PARTIAL: CPT

## 2025-02-26 PROCEDURE — 85025 COMPLETE CBC W/AUTO DIFF WBC: CPT | Performed by: STUDENT IN AN ORGANIZED HEALTH CARE EDUCATION/TRAINING PROGRAM

## 2025-02-26 PROCEDURE — 2720000007 HC OR 272 NO HCPCS: Performed by: ORTHOPAEDIC SURGERY

## 2025-02-26 PROCEDURE — 86923 COMPATIBILITY TEST ELECTRIC: CPT

## 2025-02-26 PROCEDURE — 2500000004 HC RX 250 GENERAL PHARMACY W/ HCPCS (ALT 636 FOR OP/ED): Performed by: ORTHOPAEDIC SURGERY

## 2025-02-26 PROCEDURE — 3700000001 HC GENERAL ANESTHESIA TIME - INITIAL BASE CHARGE: Performed by: ORTHOPAEDIC SURGERY

## 2025-02-26 PROCEDURE — 2500000002 HC RX 250 W HCPCS SELF ADMINISTERED DRUGS (ALT 637 FOR MEDICARE OP, ALT 636 FOR OP/ED): Performed by: ORTHOPAEDIC SURGERY

## 2025-02-26 PROCEDURE — 86850 RBC ANTIBODY SCREEN: CPT | Performed by: STUDENT IN AN ORGANIZED HEALTH CARE EDUCATION/TRAINING PROGRAM

## 2025-02-26 PROCEDURE — 84132 ASSAY OF SERUM POTASSIUM: CPT

## 2025-02-26 PROCEDURE — 7100000001 HC RECOVERY ROOM TIME - INITIAL BASE CHARGE: Performed by: ORTHOPAEDIC SURGERY

## 2025-02-26 PROCEDURE — P9045 ALBUMIN (HUMAN), 5%, 250 ML: HCPCS | Mod: JZ | Performed by: NURSE ANESTHETIST, CERTIFIED REGISTERED

## 2025-02-26 PROCEDURE — 3600000017 HC OR TIME - EACH INCREMENTAL 1 MINUTE - PROCEDURE LEVEL SIX: Performed by: ORTHOPAEDIC SURGERY

## 2025-02-26 PROCEDURE — 2500000002 HC RX 250 W HCPCS SELF ADMINISTERED DRUGS (ALT 637 FOR MEDICARE OP, ALT 636 FOR OP/ED): Performed by: PHYSICIAN ASSISTANT

## 2025-02-26 DEVICE — TRIDENT II TRITANIUM CLUSTER 52E
Type: IMPLANTABLE DEVICE | Site: HIP | Status: FUNCTIONAL
Brand: TRIDENT II

## 2025-02-26 DEVICE — CERAMIC V40 FEMORAL HEAD
Type: IMPLANTABLE DEVICE | Site: HIP | Status: FUNCTIONAL
Brand: BIOLOX

## 2025-02-26 DEVICE — TRIDENT X3 0 DEGREE POLYETHYLENE INSERT
Type: IMPLANTABLE DEVICE | Site: HIP | Status: FUNCTIONAL
Brand: TRIDENT X3 INSERT

## 2025-02-26 DEVICE — IMPLANTABLE DEVICE: Type: IMPLANTABLE DEVICE | Site: HIP | Status: FUNCTIONAL

## 2025-02-26 DEVICE — 6.5MM LOW PROFILE HEX SCREW 25MM
Type: IMPLANTABLE DEVICE | Site: HIP | Status: FUNCTIONAL
Brand: TRIDENT II

## 2025-02-26 RX ORDER — ACETAMINOPHEN 325 MG/1
650 TABLET ORAL EVERY 6 HOURS SCHEDULED
Status: DISCONTINUED | OUTPATIENT
Start: 2025-02-26 | End: 2025-03-02 | Stop reason: HOSPADM

## 2025-02-26 RX ORDER — SODIUM CHLORIDE, SODIUM LACTATE, POTASSIUM CHLORIDE, CALCIUM CHLORIDE 600; 310; 30; 20 MG/100ML; MG/100ML; MG/100ML; MG/100ML
100 INJECTION, SOLUTION INTRAVENOUS CONTINUOUS
Status: DISCONTINUED | OUTPATIENT
Start: 2025-02-26 | End: 2025-02-26

## 2025-02-26 RX ORDER — SODIUM CHLORIDE, SODIUM LACTATE, POTASSIUM CHLORIDE, CALCIUM CHLORIDE 600; 310; 30; 20 MG/100ML; MG/100ML; MG/100ML; MG/100ML
50 INJECTION, SOLUTION INTRAVENOUS CONTINUOUS
Status: DISCONTINUED | OUTPATIENT
Start: 2025-02-26 | End: 2025-02-26

## 2025-02-26 RX ORDER — FENTANYL CITRATE 50 UG/ML
50 INJECTION, SOLUTION INTRAMUSCULAR; INTRAVENOUS
Status: DISCONTINUED | OUTPATIENT
Start: 2025-02-26 | End: 2025-02-27

## 2025-02-26 RX ORDER — CEFAZOLIN SODIUM 2 G/100ML
2 INJECTION, SOLUTION INTRAVENOUS ONCE
Status: COMPLETED | OUTPATIENT
Start: 2025-02-26 | End: 2025-02-26

## 2025-02-26 RX ORDER — HALOPERIDOL LACTATE 5 MG/ML
2 INJECTION, SOLUTION INTRAMUSCULAR ONCE
Status: COMPLETED | OUTPATIENT
Start: 2025-02-26 | End: 2025-02-26

## 2025-02-26 RX ORDER — LIDOCAINE HYDROCHLORIDE 10 MG/ML
0.1 INJECTION, SOLUTION EPIDURAL; INFILTRATION; INTRACAUDAL; PERINEURAL ONCE
Status: DISCONTINUED | OUTPATIENT
Start: 2025-02-26 | End: 2025-02-26 | Stop reason: HOSPADM

## 2025-02-26 RX ORDER — FENTANYL CITRATE 50 UG/ML
50 INJECTION, SOLUTION INTRAMUSCULAR; INTRAVENOUS EVERY 5 MIN PRN
Status: DISCONTINUED | OUTPATIENT
Start: 2025-02-26 | End: 2025-02-26 | Stop reason: HOSPADM

## 2025-02-26 RX ORDER — TRANEXAMIC ACID 650 MG/1
1950 TABLET ORAL ONCE
Status: COMPLETED | OUTPATIENT
Start: 2025-02-26 | End: 2025-02-26

## 2025-02-26 RX ORDER — OXYCODONE HYDROCHLORIDE 5 MG/1
10 TABLET ORAL EVERY 6 HOURS PRN
Status: DISCONTINUED | OUTPATIENT
Start: 2025-02-26 | End: 2025-02-28

## 2025-02-26 RX ORDER — ACETAMINOPHEN 325 MG/1
650 TABLET ORAL ONCE
Status: COMPLETED | OUTPATIENT
Start: 2025-02-26 | End: 2025-02-26

## 2025-02-26 RX ORDER — TRANEXAMIC ACID 650 MG/1
1950 TABLET ORAL ONCE
Status: COMPLETED | OUTPATIENT
Start: 2025-02-27 | End: 2025-02-27

## 2025-02-26 RX ORDER — CELECOXIB 200 MG/1
200 CAPSULE ORAL ONCE
Status: COMPLETED | OUTPATIENT
Start: 2025-02-26 | End: 2025-02-26

## 2025-02-26 RX ORDER — ALBUMIN HUMAN 50 G/1000ML
SOLUTION INTRAVENOUS AS NEEDED
Status: DISCONTINUED | OUTPATIENT
Start: 2025-02-26 | End: 2025-02-26

## 2025-02-26 RX ORDER — ONDANSETRON HYDROCHLORIDE 2 MG/ML
4 INJECTION, SOLUTION INTRAVENOUS EVERY 6 HOURS PRN
Status: DISCONTINUED | OUTPATIENT
Start: 2025-02-26 | End: 2025-02-26

## 2025-02-26 RX ORDER — ASPIRIN 81 MG/1
81 TABLET ORAL 2 TIMES DAILY
Status: CANCELLED | OUTPATIENT
Start: 2025-02-26

## 2025-02-26 RX ORDER — MIDAZOLAM HYDROCHLORIDE 1 MG/ML
INJECTION, SOLUTION INTRAMUSCULAR; INTRAVENOUS AS NEEDED
Status: DISCONTINUED | OUTPATIENT
Start: 2025-02-26 | End: 2025-02-26

## 2025-02-26 RX ORDER — OXYCODONE HYDROCHLORIDE 5 MG/1
5 TABLET ORAL EVERY 4 HOURS PRN
Status: DISCONTINUED | OUTPATIENT
Start: 2025-02-26 | End: 2025-02-27

## 2025-02-26 RX ORDER — BISACODYL 5 MG
10 TABLET, DELAYED RELEASE (ENTERIC COATED) ORAL DAILY PRN
Status: DISCONTINUED | OUTPATIENT
Start: 2025-02-26 | End: 2025-03-02 | Stop reason: HOSPADM

## 2025-02-26 RX ORDER — ONDANSETRON 4 MG/1
4 TABLET, ORALLY DISINTEGRATING ORAL EVERY 8 HOURS PRN
Status: DISCONTINUED | OUTPATIENT
Start: 2025-02-26 | End: 2025-03-02 | Stop reason: HOSPADM

## 2025-02-26 RX ORDER — CYCLOBENZAPRINE HCL 10 MG
10 TABLET ORAL 3 TIMES DAILY PRN
Status: DISCONTINUED | OUTPATIENT
Start: 2025-02-26 | End: 2025-03-02 | Stop reason: HOSPADM

## 2025-02-26 RX ORDER — BUPIVACAINE HYDROCHLORIDE 7.5 MG/ML
INJECTION INTRAVENOUS AS NEEDED
Status: DISCONTINUED | OUTPATIENT
Start: 2025-02-26 | End: 2025-02-26

## 2025-02-26 RX ORDER — PROCHLORPERAZINE EDISYLATE 5 MG/ML
10 INJECTION INTRAMUSCULAR; INTRAVENOUS EVERY 6 HOURS PRN
Status: DISCONTINUED | OUTPATIENT
Start: 2025-02-26 | End: 2025-03-02 | Stop reason: HOSPADM

## 2025-02-26 RX ORDER — KETOROLAC TROMETHAMINE 30 MG/ML
15 INJECTION, SOLUTION INTRAMUSCULAR; INTRAVENOUS EVERY 6 HOURS
Status: CANCELLED | OUTPATIENT
Start: 2025-02-26 | End: 2025-02-27

## 2025-02-26 RX ORDER — GLYCOPYRROLATE 0.2 MG/ML
INJECTION INTRAMUSCULAR; INTRAVENOUS AS NEEDED
Status: DISCONTINUED | OUTPATIENT
Start: 2025-02-26 | End: 2025-02-26

## 2025-02-26 RX ORDER — SODIUM CHLORIDE 0.9 G/100ML
INJECTION, SOLUTION IRRIGATION AS NEEDED
Status: DISCONTINUED | OUTPATIENT
Start: 2025-02-26 | End: 2025-02-26 | Stop reason: HOSPADM

## 2025-02-26 RX ORDER — ONDANSETRON HYDROCHLORIDE 2 MG/ML
4 INJECTION, SOLUTION INTRAVENOUS EVERY 8 HOURS PRN
Status: DISCONTINUED | OUTPATIENT
Start: 2025-02-26 | End: 2025-03-02 | Stop reason: HOSPADM

## 2025-02-26 RX ORDER — DROPERIDOL 2.5 MG/ML
0.62 INJECTION, SOLUTION INTRAMUSCULAR; INTRAVENOUS ONCE AS NEEDED
Status: DISCONTINUED | OUTPATIENT
Start: 2025-02-26 | End: 2025-02-26 | Stop reason: HOSPADM

## 2025-02-26 RX ORDER — NALOXONE HYDROCHLORIDE 1 MG/ML
0.2 INJECTION INTRAMUSCULAR; INTRAVENOUS; SUBCUTANEOUS EVERY 5 MIN PRN
Status: DISCONTINUED | OUTPATIENT
Start: 2025-02-26 | End: 2025-03-02 | Stop reason: HOSPADM

## 2025-02-26 RX ORDER — WATER 1 ML/ML
IRRIGANT IRRIGATION AS NEEDED
Status: DISCONTINUED | OUTPATIENT
Start: 2025-02-26 | End: 2025-02-26 | Stop reason: HOSPADM

## 2025-02-26 RX ORDER — LABETALOL HYDROCHLORIDE 5 MG/ML
5 INJECTION, SOLUTION INTRAVENOUS ONCE AS NEEDED
Status: DISCONTINUED | OUTPATIENT
Start: 2025-02-26 | End: 2025-02-26 | Stop reason: HOSPADM

## 2025-02-26 RX ORDER — PHENYLEPHRINE 10 MG/250 ML(40 MCG/ML)IN 0.9 % SOD.CHLORIDE INTRAVENOUS
CONTINUOUS PRN
Status: DISCONTINUED | OUTPATIENT
Start: 2025-02-26 | End: 2025-02-26

## 2025-02-26 RX ORDER — PROCHLORPERAZINE 25 MG/1
25 SUPPOSITORY RECTAL EVERY 12 HOURS PRN
Status: DISCONTINUED | OUTPATIENT
Start: 2025-02-26 | End: 2025-03-02 | Stop reason: HOSPADM

## 2025-02-26 RX ORDER — ESCITALOPRAM OXALATE 5 MG/1
7.5 TABLET ORAL NIGHTLY
Status: DISCONTINUED | OUTPATIENT
Start: 2025-02-26 | End: 2025-03-02 | Stop reason: HOSPADM

## 2025-02-26 RX ORDER — CEFAZOLIN SODIUM 2 G/100ML
2 INJECTION, SOLUTION INTRAVENOUS EVERY 8 HOURS
Status: COMPLETED | OUTPATIENT
Start: 2025-02-26 | End: 2025-02-27

## 2025-02-26 RX ORDER — PROPOFOL 10 MG/ML
INJECTION, EMULSION INTRAVENOUS AS NEEDED
Status: DISCONTINUED | OUTPATIENT
Start: 2025-02-26 | End: 2025-02-26

## 2025-02-26 RX ORDER — PHENYLEPHRINE HCL IN 0.9% NACL 1 MG/10 ML
SYRINGE (ML) INTRAVENOUS AS NEEDED
Status: DISCONTINUED | OUTPATIENT
Start: 2025-02-26 | End: 2025-02-26

## 2025-02-26 RX ORDER — DIPHENHYDRAMINE HYDROCHLORIDE 50 MG/ML
12.5 INJECTION INTRAMUSCULAR; INTRAVENOUS ONCE AS NEEDED
Status: DISCONTINUED | OUTPATIENT
Start: 2025-02-26 | End: 2025-02-26 | Stop reason: HOSPADM

## 2025-02-26 RX ORDER — FAMOTIDINE 10 MG/ML
INJECTION INTRAVENOUS AS NEEDED
Status: DISCONTINUED | OUTPATIENT
Start: 2025-02-26 | End: 2025-02-26

## 2025-02-26 RX ORDER — OXYCODONE HYDROCHLORIDE 5 MG/1
5 TABLET ORAL EVERY 4 HOURS PRN
Status: DISCONTINUED | OUTPATIENT
Start: 2025-02-26 | End: 2025-03-02 | Stop reason: HOSPADM

## 2025-02-26 RX ORDER — ONDANSETRON HYDROCHLORIDE 2 MG/ML
INJECTION, SOLUTION INTRAVENOUS
Status: COMPLETED
Start: 2025-02-26 | End: 2025-02-26

## 2025-02-26 RX ORDER — SODIUM CHLORIDE, SODIUM LACTATE, POTASSIUM CHLORIDE, CALCIUM CHLORIDE 600; 310; 30; 20 MG/100ML; MG/100ML; MG/100ML; MG/100ML
50 INJECTION, SOLUTION INTRAVENOUS CONTINUOUS
Status: ACTIVE | OUTPATIENT
Start: 2025-02-26 | End: 2025-02-27

## 2025-02-26 RX ORDER — MEPERIDINE HYDROCHLORIDE 25 MG/ML
12.5 INJECTION INTRAMUSCULAR; INTRAVENOUS; SUBCUTANEOUS EVERY 10 MIN PRN
Status: DISCONTINUED | OUTPATIENT
Start: 2025-02-26 | End: 2025-02-26 | Stop reason: HOSPADM

## 2025-02-26 RX ORDER — LEVOTHYROXINE SODIUM 112 UG/1
112 TABLET ORAL
Status: DISCONTINUED | OUTPATIENT
Start: 2025-02-26 | End: 2025-03-02 | Stop reason: HOSPADM

## 2025-02-26 RX ORDER — DOCUSATE SODIUM 100 MG/1
100 CAPSULE, LIQUID FILLED ORAL 2 TIMES DAILY
Status: DISCONTINUED | OUTPATIENT
Start: 2025-02-26 | End: 2025-03-02 | Stop reason: HOSPADM

## 2025-02-26 RX ORDER — ONDANSETRON HYDROCHLORIDE 2 MG/ML
INJECTION, SOLUTION INTRAVENOUS AS NEEDED
Status: DISCONTINUED | OUTPATIENT
Start: 2025-02-26 | End: 2025-02-26

## 2025-02-26 RX ORDER — PROCHLORPERAZINE MALEATE 5 MG
10 TABLET ORAL EVERY 6 HOURS PRN
Status: DISCONTINUED | OUTPATIENT
Start: 2025-02-26 | End: 2025-03-02 | Stop reason: HOSPADM

## 2025-02-26 RX ADMIN — GLYCOPYRROLATE 0.2 MG: 0.2 INJECTION, SOLUTION INTRAMUSCULAR; INTRAVENOUS at 10:30

## 2025-02-26 RX ADMIN — FENTANYL CITRATE 50 MCG: 50 INJECTION INTRAMUSCULAR; INTRAVENOUS at 13:24

## 2025-02-26 RX ADMIN — ALBUMIN HUMAN 250 ML: 0.05 INJECTION, SOLUTION INTRAVENOUS at 08:32

## 2025-02-26 RX ADMIN — ONDANSETRON HYDROCHLORIDE 4 MG: 2 INJECTION, SOLUTION INTRAVENOUS at 14:01

## 2025-02-26 RX ADMIN — PROCHLORPERAZINE EDISYLATE 10 MG: 5 INJECTION INTRAMUSCULAR; INTRAVENOUS at 20:28

## 2025-02-26 RX ADMIN — SODIUM CHLORIDE, POTASSIUM CHLORIDE, SODIUM LACTATE AND CALCIUM CHLORIDE: 600; 310; 30; 20 INJECTION, SOLUTION INTRAVENOUS at 07:30

## 2025-02-26 RX ADMIN — GLYCOPYRROLATE 0.2 MG: 0.2 INJECTION, SOLUTION INTRAMUSCULAR; INTRAVENOUS at 07:39

## 2025-02-26 RX ADMIN — TRANEXAMIC ACID 1950 MG: 650 TABLET ORAL at 06:07

## 2025-02-26 RX ADMIN — PHENYLEPHRINE 10 MG/250 ML(40 MCG/ML)IN 0.9 % SOD.CHLORIDE INTRAVENOUS 0.4 MCG/KG/MIN: at 08:48

## 2025-02-26 RX ADMIN — SODIUM CHLORIDE, POTASSIUM CHLORIDE, SODIUM LACTATE AND CALCIUM CHLORIDE 50 ML/HR: 600; 310; 30; 20 INJECTION, SOLUTION INTRAVENOUS at 06:29

## 2025-02-26 RX ADMIN — SODIUM CHLORIDE, POTASSIUM CHLORIDE, SODIUM LACTATE AND CALCIUM CHLORIDE 50 ML/HR: 600; 310; 30; 20 INJECTION, SOLUTION INTRAVENOUS at 14:06

## 2025-02-26 RX ADMIN — FENTANYL CITRATE 50 MCG: 50 INJECTION INTRAMUSCULAR; INTRAVENOUS at 18:00

## 2025-02-26 RX ADMIN — Medication 200 MCG: at 08:34

## 2025-02-26 RX ADMIN — FENTANYL CITRATE 50 MCG: 50 INJECTION INTRAMUSCULAR; INTRAVENOUS at 12:07

## 2025-02-26 RX ADMIN — PROCHLORPERAZINE EDISYLATE 10 MG: 5 INJECTION INTRAMUSCULAR; INTRAVENOUS at 14:36

## 2025-02-26 RX ADMIN — MIDAZOLAM 2 MG: 1 INJECTION INTRAMUSCULAR; INTRAVENOUS at 07:15

## 2025-02-26 RX ADMIN — ONDANSETRON 4 MG: 2 INJECTION, SOLUTION INTRAMUSCULAR; INTRAVENOUS at 07:39

## 2025-02-26 RX ADMIN — Medication 200 MCG: at 08:31

## 2025-02-26 RX ADMIN — CELECOXIB 200 MG: 200 CAPSULE ORAL at 06:07

## 2025-02-26 RX ADMIN — SODIUM CHLORIDE, POTASSIUM CHLORIDE, SODIUM LACTATE AND CALCIUM CHLORIDE: 600; 310; 30; 20 INJECTION, SOLUTION INTRAVENOUS at 08:07

## 2025-02-26 RX ADMIN — CEFAZOLIN SODIUM 2 G: 2 INJECTION, SOLUTION INTRAVENOUS at 07:39

## 2025-02-26 RX ADMIN — Medication 100 MCG: at 08:57

## 2025-02-26 RX ADMIN — MIDAZOLAM 2 MG: 1 INJECTION INTRAMUSCULAR; INTRAVENOUS at 07:31

## 2025-02-26 RX ADMIN — CEFAZOLIN SODIUM 2 G: 2 INJECTION, SOLUTION INTRAVENOUS at 16:19

## 2025-02-26 RX ADMIN — ALBUMIN HUMAN 250 ML: 0.05 INJECTION, SOLUTION INTRAVENOUS at 09:11

## 2025-02-26 RX ADMIN — TRANEXAMIC ACID 1950 MG: 650 TABLET ORAL at 16:20

## 2025-02-26 RX ADMIN — PROPOFOL 50 MG: 10 INJECTION, EMULSION INTRAVENOUS at 07:41

## 2025-02-26 RX ADMIN — Medication 200 MCG: at 08:40

## 2025-02-26 RX ADMIN — POVIDONE-IODINE 1 APPLICATION: 5 SOLUTION TOPICAL at 06:06

## 2025-02-26 RX ADMIN — Medication 200 MCG: at 08:29

## 2025-02-26 RX ADMIN — HALOPERIDOL LACTATE 2 MG: 5 INJECTION, SOLUTION INTRAMUSCULAR at 22:00

## 2025-02-26 RX ADMIN — PHENYLEPHRINE HYDROCHLORIDE 0.5 MCG/KG/MIN: 10 INJECTION INTRAVENOUS at 15:07

## 2025-02-26 RX ADMIN — ONDANSETRON 4 MG: 2 INJECTION, SOLUTION INTRAMUSCULAR; INTRAVENOUS at 14:01

## 2025-02-26 RX ADMIN — ALBUMIN HUMAN 250 ML: 0.05 INJECTION, SOLUTION INTRAVENOUS at 08:48

## 2025-02-26 RX ADMIN — PROPOFOL 50 MCG/KG/MIN: 10 INJECTION, EMULSION INTRAVENOUS at 07:42

## 2025-02-26 RX ADMIN — ONDANSETRON 4 MG: 2 INJECTION, SOLUTION INTRAMUSCULAR; INTRAVENOUS at 10:30

## 2025-02-26 RX ADMIN — FENTANYL CITRATE 50 MCG: 50 INJECTION INTRAMUSCULAR; INTRAVENOUS at 22:05

## 2025-02-26 RX ADMIN — ESCITALOPRAM 7.5 MG: 5 TABLET, FILM COATED ORAL at 21:02

## 2025-02-26 RX ADMIN — BUPIVACAINE HYDROCHLORIDE IN DEXTROSE 1.8 ML: 7.5 INJECTION, SOLUTION SUBARACHNOID at 07:37

## 2025-02-26 RX ADMIN — ACETAMINOPHEN 650 MG: 325 TABLET ORAL at 06:07

## 2025-02-26 RX ADMIN — ONDANSETRON 4 MG: 2 INJECTION INTRAMUSCULAR; INTRAVENOUS at 17:17

## 2025-02-26 RX ADMIN — FENTANYL CITRATE 50 MCG: 50 INJECTION INTRAMUSCULAR; INTRAVENOUS at 15:12

## 2025-02-26 RX ADMIN — FAMOTIDINE 20 MG: 10 INJECTION, SOLUTION INTRAVENOUS at 07:48

## 2025-02-26 SDOH — SOCIAL STABILITY: SOCIAL INSECURITY: DO YOU FEEL ANYONE HAS EXPLOITED OR TAKEN ADVANTAGE OF YOU FINANCIALLY OR OF YOUR PERSONAL PROPERTY?: NO

## 2025-02-26 SDOH — SOCIAL STABILITY: SOCIAL INSECURITY
ASK PARENT OR GUARDIAN: ARE THERE TIMES WHEN YOU, YOUR CHILD(REN), OR ANY MEMBER OF YOUR HOUSEHOLD FEEL UNSAFE, HARMED, OR THREATENED AROUND PERSONS WITH WHOM YOU KNOW OR LIVE?: NO

## 2025-02-26 SDOH — SOCIAL STABILITY: SOCIAL INSECURITY: WERE YOU ABLE TO COMPLETE ALL THE BEHAVIORAL HEALTH SCREENINGS?: YES

## 2025-02-26 SDOH — ECONOMIC STABILITY: FOOD INSECURITY: WITHIN THE PAST 12 MONTHS, THE FOOD YOU BOUGHT JUST DIDN'T LAST AND YOU DIDN'T HAVE MONEY TO GET MORE.: NEVER TRUE

## 2025-02-26 SDOH — SOCIAL STABILITY: SOCIAL INSECURITY: WITHIN THE LAST YEAR, HAVE YOU BEEN AFRAID OF YOUR PARTNER OR EX-PARTNER?: NO

## 2025-02-26 SDOH — SOCIAL STABILITY: SOCIAL INSECURITY
WITHIN THE LAST YEAR, HAVE YOU BEEN RAPED OR FORCED TO HAVE ANY KIND OF SEXUAL ACTIVITY BY YOUR PARTNER OR EX-PARTNER?: NO

## 2025-02-26 SDOH — ECONOMIC STABILITY: FOOD INSECURITY: WITHIN THE PAST 12 MONTHS, YOU WORRIED THAT YOUR FOOD WOULD RUN OUT BEFORE YOU GOT THE MONEY TO BUY MORE.: NEVER TRUE

## 2025-02-26 SDOH — SOCIAL STABILITY: SOCIAL INSECURITY
WITHIN THE LAST YEAR, HAVE YOU BEEN KICKED, HIT, SLAPPED, OR OTHERWISE PHYSICALLY HURT BY YOUR PARTNER OR EX-PARTNER?: NO

## 2025-02-26 SDOH — ECONOMIC STABILITY: INCOME INSECURITY: IN THE PAST 12 MONTHS HAS THE ELECTRIC, GAS, OIL, OR WATER COMPANY THREATENED TO SHUT OFF SERVICES IN YOUR HOME?: NO

## 2025-02-26 SDOH — SOCIAL STABILITY: SOCIAL INSECURITY: ARE THERE ANY APPARENT SIGNS OF INJURIES/BEHAVIORS THAT COULD BE RELATED TO ABUSE/NEGLECT?: NO

## 2025-02-26 SDOH — SOCIAL STABILITY: SOCIAL INSECURITY: ARE YOU OR HAVE YOU BEEN THREATENED OR ABUSED PHYSICALLY, EMOTIONALLY, OR SEXUALLY BY ANYONE?: NO

## 2025-02-26 SDOH — SOCIAL STABILITY: SOCIAL INSECURITY: WITHIN THE LAST YEAR, HAVE YOU BEEN HUMILIATED OR EMOTIONALLY ABUSED IN OTHER WAYS BY YOUR PARTNER OR EX-PARTNER?: NO

## 2025-02-26 SDOH — SOCIAL STABILITY: SOCIAL INSECURITY: HAS ANYONE EVER THREATENED TO HURT YOUR FAMILY OR YOUR PETS?: NO

## 2025-02-26 SDOH — SOCIAL STABILITY: SOCIAL INSECURITY: ABUSE: ADULT

## 2025-02-26 SDOH — SOCIAL STABILITY: SOCIAL INSECURITY: HAVE YOU HAD ANY THOUGHTS OF HARMING ANYONE ELSE?: NO

## 2025-02-26 SDOH — ECONOMIC STABILITY: HOUSING INSECURITY: DO YOU FEEL UNSAFE GOING BACK TO THE PLACE WHERE YOU LIVE?: NO

## 2025-02-26 SDOH — SOCIAL STABILITY: SOCIAL INSECURITY: HAVE YOU HAD THOUGHTS OF HARMING ANYONE ELSE?: NO

## 2025-02-26 SDOH — SOCIAL STABILITY: SOCIAL INSECURITY: DO YOU FEEL UNSAFE GOING BACK TO THE PLACE WHERE YOU ARE LIVING?: NO

## 2025-02-26 SDOH — SOCIAL STABILITY: SOCIAL INSECURITY: DOES ANYONE TRY TO KEEP YOU FROM HAVING/CONTACTING OTHER FRIENDS OR DOING THINGS OUTSIDE YOUR HOME?: NO

## 2025-02-26 ASSESSMENT — LIFESTYLE VARIABLES
AUDIT-C TOTAL SCORE: 1
HOW OFTEN DO YOU HAVE A DRINK CONTAINING ALCOHOL: MONTHLY OR LESS
HOW MANY STANDARD DRINKS CONTAINING ALCOHOL DO YOU HAVE ON A TYPICAL DAY: 1 OR 2
SKIP TO QUESTIONS 9-10: 1
AUDIT-C TOTAL SCORE: 1
HOW OFTEN DO YOU HAVE 6 OR MORE DRINKS ON ONE OCCASION: NEVER

## 2025-02-26 ASSESSMENT — PAIN - FUNCTIONAL ASSESSMENT

## 2025-02-26 ASSESSMENT — PAIN SCALES - GENERAL
PAINLEVEL_OUTOF10: 0 - NO PAIN
PAINLEVEL_OUTOF10: 0 - NO PAIN
PAINLEVEL_OUTOF10: 5 - MODERATE PAIN
PAINLEVEL_OUTOF10: 5 - MODERATE PAIN
PAINLEVEL_OUTOF10: 3
PAINLEVEL_OUTOF10: 0 - NO PAIN
PAINLEVEL_OUTOF10: 0 - NO PAIN
PAINLEVEL_OUTOF10: 6
PAINLEVEL_OUTOF10: 2
PAINLEVEL_OUTOF10: 0 - NO PAIN
PAINLEVEL_OUTOF10: 0 - NO PAIN
PAINLEVEL_OUTOF10: 5 - MODERATE PAIN
PAINLEVEL_OUTOF10: 3
PAINLEVEL_OUTOF10: 5 - MODERATE PAIN
PAINLEVEL_OUTOF10: 2
PAIN_LEVEL: 0
PAINLEVEL_OUTOF10: 3
PAINLEVEL_OUTOF10: 0 - NO PAIN
PAINLEVEL_OUTOF10: 0 - NO PAIN
PAINLEVEL_OUTOF10: 5 - MODERATE PAIN
PAINLEVEL_OUTOF10: 0 - NO PAIN
PAINLEVEL_OUTOF10: 0 - NO PAIN
PAINLEVEL_OUTOF10: 3
PAINLEVEL_OUTOF10: 8
PAINLEVEL_OUTOF10: 0 - NO PAIN
PAINLEVEL_OUTOF10: 2
PAINLEVEL_OUTOF10: 0 - NO PAIN
PAINLEVEL_OUTOF10: 8
PAINLEVEL_OUTOF10: 8
PAINLEVEL_OUTOF10: 0 - NO PAIN
PAINLEVEL_OUTOF10: 5 - MODERATE PAIN

## 2025-02-26 ASSESSMENT — ACTIVITIES OF DAILY LIVING (ADL)
GROOMING: INDEPENDENT
WALKS IN HOME: INDEPENDENT
DRESSING YOURSELF: INDEPENDENT
JUDGMENT_ADEQUATE_SAFELY_COMPLETE_DAILY_ACTIVITIES: YES
ADEQUATE_TO_COMPLETE_ADL: YES
LACK_OF_TRANSPORTATION: NO
HEARING - RIGHT EAR: FUNCTIONAL
TOILETING: INDEPENDENT
FEEDING YOURSELF: INDEPENDENT
PATIENT'S MEMORY ADEQUATE TO SAFELY COMPLETE DAILY ACTIVITIES?: YES
BATHING: INDEPENDENT
HEARING - LEFT EAR: FUNCTIONAL

## 2025-02-26 ASSESSMENT — PAIN SCALES - PAIN ASSESSMENT IN ADVANCED DEMENTIA (PAINAD): TOTALSCORE: MEDICATION (SEE MAR)

## 2025-02-26 ASSESSMENT — COGNITIVE AND FUNCTIONAL STATUS - GENERAL
DAILY ACTIVITIY SCORE: 24
PATIENT BASELINE BEDBOUND: NO
PATIENT BASELINE BEDBOUND: NO
MOBILITY SCORE: 24

## 2025-02-26 ASSESSMENT — PAIN DESCRIPTION - DESCRIPTORS: DESCRIPTORS: ACHING

## 2025-02-26 ASSESSMENT — COLUMBIA-SUICIDE SEVERITY RATING SCALE - C-SSRS
1. IN THE PAST MONTH, HAVE YOU WISHED YOU WERE DEAD OR WISHED YOU COULD GO TO SLEEP AND NOT WAKE UP?: NO
6. HAVE YOU EVER DONE ANYTHING, STARTED TO DO ANYTHING, OR PREPARED TO DO ANYTHING TO END YOUR LIFE?: NO
2. HAVE YOU ACTUALLY HAD ANY THOUGHTS OF KILLING YOURSELF?: NO

## 2025-02-26 ASSESSMENT — PATIENT HEALTH QUESTIONNAIRE - PHQ9
1. LITTLE INTEREST OR PLEASURE IN DOING THINGS: NOT AT ALL
SUM OF ALL RESPONSES TO PHQ9 QUESTIONS 1 & 2: 0
2. FEELING DOWN, DEPRESSED OR HOPELESS: NOT AT ALL

## 2025-02-26 ASSESSMENT — PAIN DESCRIPTION - ORIENTATION: ORIENTATION: RIGHT

## 2025-02-26 ASSESSMENT — PAIN DESCRIPTION - LOCATION: LOCATION: HIP

## 2025-02-26 NOTE — ADDENDUM NOTE
Addendum  created 02/26/25 1032 by AURORA Armstrong    Intraprocedure Event edited, Intraprocedure Meds edited

## 2025-02-26 NOTE — OP NOTE
Arthroplasty Total Hip DIRECT  Anterior Approach (R) Operative Note     Date: 2025  OR Location: [facility] OR    Name: Cindy Deluna, : 1970, Age: 54 y.o., MRN: [id number], Sex: female    Diagnosis  Pre-op Diagnosis      * Unilateral primary osteoarthritis, right hip [M16.11] Post-op Diagnosis     * Unilateral primary osteoarthritis, right hip [M16.11]     Procedures  Arthroplasty Total Hip DIRECT  Anterior Approach  79529 - CO ARTHRP ACETBLR/PROX FEM PROSTC AGRFT/ALGRFT      Surgeons      * Rey Ivey - Primary    Resident/Fellow/Other Assistant:  Surgeons and Role:     * Shukri Mccullough PA-C - Assisting    Staff:   Circulator: Everton  Scrub Person: Sabra  Surgical Assistant: Ibrahima    Anesthesia Staff: Anesthesiologist: Richar Henry DO  CRNA: SORAYA Armstrong-CRNA    Procedure Summary  Anesthesia: Anesthesia type not filed in the log.  ASA: III  Estimated Blood Loss: 1000 mL  Intra-op Medications:   Administrations occurring from 705 to 935 on 25:   Medication Name Total Dose   sodium chloride 0.9 % irrigation solution 3,000 mL   sterile water irrigation solution 1,000 mL   albumin human bottle 5% 750 mL   bupivacaine PF 0.75 %-dextrose 8.25 % (Sensorcaine) intrathecal 1.8 mL   famotidine (Pepcid) 10 mg/mL 20 mg   glycopyrrolate (Robinul) injection 0.2 mg/mL 0.2 mg   LR bolus Cannot be calculated   midazolam (Versed) 1 mg/1 mL 4 mg   ondansetron 2 mg/mL 4 mg   phenylephrine (Geovani-Synephrine) 10 mg/250 mL NS (40 mcg/mL) infusion 1.77 mg   phenylephrine 100 mcg/mL syringe 10 mL (prefilled) 900 mcg   propofol (Diprivan) infusion 10 mg/mL 581.67 mg   ceFAZolin (Ancef) 2 g in dextrose (iso)  mL 2 g              Anesthesia Record               Intraprocedure I/O Totals          Intake    LR bolus 1000.00 mL    Propofol Drip 0.00 mL    The total shown is the total volume documented since Anesthesia Start was filed.    Phenylephrine Drip 0.00 mL    The total shown is the total  volume documented since Anesthesia Start was filed.    Total Intake 1000 mL       Output    Est. Blood Loss 1000 mL    Total Output 1000 mL       Net    Net Volume 0 mL          Specimen: No specimens collected              Drains and/or Catheters: * None in log *    Tourniquet Times:         Implants:  Implants       Type Name Action Serial No.      Joint SHELL, TRIDENT II, CLUSTERHOLE, 52E - [id number] Implanted      Joint INSERT, TRIDENT X3 POLYETHYLENE, 0 DEG, 36MM E - [id number] Implanted      Screw SCREW, LOW PROFILE HEX, 6.5 X 25 MM - [id number] Implanted      Joint HEAD, FEMUR V40 36MM 0MM BIOLOX DELTA - [id number] Implanted       SIZE 0 X 33.5MM NECK CANDELARIA X 93MM STEM CANDELARIA, INSIGNIA, HIGH OFFSET HIP STEM  Implanted               Findings: Osteoarthrosis right hip    Indications: Cindy Deluna is an 54 y.o. female who is having surgery for Unilateral primary osteoarthritis, right hip [M16.11].     The patient was seen in the preoperative area. The risks, benefits, complications, treatment options, non-operative alternatives, expected recovery and outcomes were discussed with the patient. The possibilities of reaction to medication, pulmonary aspiration, injury to surrounding structures, bleeding, recurrent infection, the need for additional procedures, failure to diagnose a condition, and creating a complication requiring transfusion or operation were discussed with the patient. The patient concurred with the proposed plan, giving informed consent.  The site of surgery was properly noted/marked if necessary per policy. The patient has been actively warmed in preoperative area. Preoperative antibiotics have been ordered and given within 1 hours of incision. 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 The  patient wishes to proceed with total hip arthroplasty. The procedure was explained along with the risks, benefits alternatives being reviewed. Potential risks including but not limited to infection, neurovascular complication, instability, loosening, wear, limb length inequality, DVT along with the potential need for reoperation and/or revision surgery were all discussed with the patient and they consented to the procedure.        Components: Embarrass insignia, Trident 2     See above implant record     Procedure:     The patient was brought to the operative suite. A spinal anesthetic was administered per anesthesia. The patient was then placed on the [place] table in the supine position.  The perineal post was placed and padded.  The feet were padded and placed in the traction boots.  A U drape was used to exclude the operative hip and lower extremity from the rest of the body. The hip and lower extremity was then sterilely prepped with ChloraPrep then sterilely draped in the usual manner.    A timeout was performed, the patient was then identified, the site, laterality and procedure confirmed along with the administration of the antibiotics.     I began by identifying landmarks. I made an incision 2 fingerbreadths distal and lateral to the anterior superior iliac spine on the left.  I used careful dissection through the subcutaneous and fatty tissues.  I identified the tensor fascia incised this in the line of the incision.  2 Allis Clamps were placed on the medial portion of the tensor fascia.  I then bluntly dissected the musculature off the fascia.  Identified the plane between the sartorius and the tensor.  I placed a Hohmann retractor then over the superior neck of the femur.  Next identified the lateral circumflex vessels.  These were isolated and cauterized.  I then placed a retractor over the inferior neck.  I then debrided some of the pericapsular fat.  Next I elevated the rectus off of the capsule.  A  retractor was placed anteriorly and superiorly over the acetabulum.  I then performed a capsulotomy.  I debrided the capsule and was able to identify the femoral head.  I marked the length of my neck cut of what I had templated to.  I placed the femur in 30 degrees of external rotation and applied  some traction.  I then made my neck cut.   I remove the femoral head then.  Retractors were placed around the acetabulum.  I debrided the labrum and capsule.  I began reaming then with a 46 mm reamer.  I reamed up to 52 mm.  When I reamed with the 52 mm reamer I brought in C arm.  Using the standing AP preoperative pelvis x-ray I matched this with the C arm.  I then centered the C arm over the cup.  I completed my reaming and about 40 to 45 degrees of horizontal abduction and 15 degrees of anteversion.  I was satisfied with the position in relation to the teardrop.  At that point I removed the reamer.  I thoroughly and copiously irrigated with normal saline.  Next with the aid of C arm I impacted the permanent cup in 40 to 45 degrees of horizontal abduction and 15 degrees of anteversion.  I checked the cup to be sure it was seated.  Next I drilled and placed a single screw.  I then impacted the liner.  I checked this to be sure it was seated.  I then debrided any osteophytes with a rongeur.     I turned my attention then to the femur.  I placed the [place] hook around the intertrochanteric region.  I then dropped the left leg to the floor externally rotated only to 120 degrees and adducted the leg.  I then used the lift.  I placed a retractor over the lesser trochanter as well as the greater trochanter.  I performed my soft tissue releases around the femur releasing the capsule as well as the conjoined tendon.  Next I used a box osteotome.  I then used a rat tail rasp to find the canal.  I began broaching then with the chili pepper broach.  I broached sequentially up to a size 0.      I began trialing then off the femoral  broach with various neck lengths.  Was taken through range of motion to assure stability.  I also used C arm to check my leg lengths.  Once I was able to achieve a stable construct I redislocated the hip. The broach was removed. I then impacted the stem matching the anteversion that I had broached to.  Then impacted the permanent size 0 high offset stem.  I then cleaned off the ying taper. The head was then impacted onto the stem. I checked to be sure the head was seated. The hip was reduced. I again took the hip through range of motion and was satisfied with my stability.  I checked C arm and was satisfied with the construct as well as my limb lengths.  I then infiltrated the soft tissues with a mixture of ropivacaine with epinephrine, clonidine and Toradol.     At that point I thoroughly copiously irrigated with normal saline. Attention was turned to closure.  The tensor fascia was repaired with #1 Vicryl.  The deep fatty tissue was repaired with 0 Vicryl. Subcutaneous tissue closure was with 2-0 Vicryl. The skin was closed with Monocryl and Dermabond. A sterile waterproof dressing was applied.    The physician assistant was present for the entire case.  Given the nature of the procedure and disease process a skilled surgical assistant was necessary for the case.  The assistant was necessary for retraction and helped directly facilitate completion of the surgery.  A certified scrub tech was at the back table managing instruments and supplies for the surgical procedure.    Complications:  Unavoidable and inherent to the procedure was significant oozing and bleeding     Disposition: PACU - guarded condition.  Condition: stable         Task Performed by RNFA or Surgical Assistant:  The physician assistant was present for the entire case.  Given the nature of the procedure and disease process a skilled surgical assistant was necessary for the case.  The assistant was necessary for retraction and helped directly  facilitate completion of the surgery.  A certified scrub tech was at the back table managing instruments and supplies for the surgical procedure.          Additional Details: Not applicable    Attending Attestation: I performed the procedure.    Rey Ivey  Phone Number: [phone]

## 2025-02-26 NOTE — ANESTHESIA PROCEDURE NOTES
Spinal Block    Patient location during procedure: OR  Start time: 2/26/2025 7:30 AM  End time: 2/26/2025 7:37 AM  Reason for block: primary anesthetic and at surgeon's request  Staffing  Performed: CRNA   Authorized by: Richar Henry DO    Performed by: AURORA Armstrong    Preanesthetic Checklist  Completed: patient identified, IV checked, site marked, risks and benefits discussed, surgical consent, monitors and equipment checked, pre-op evaluation, timeout performed and sterile techniques followed  Block Timeout  RN/Licensed healthcare professional reads aloud to the Anesthesia provider and entire team: Patient identity, procedure with side and site, patient position, and as applicable the availability of implants/special equipment/special requirements.  Patient on coagulant treatment: no  Timeout performed at: 2/26/2025 7:30 AM  Spinal Block  Patient position: sitting  Prep: Betadine  Sterility prep: cap, drape, gloves, hand hygiene and mask  Sedation level: light sedation  Patient monitoring: blood pressure, continuous pulse oximetry, EKG, ETCO2 and heart rate  Approach: midline  Vertebral space: L4-5  Injection technique: single-shot  Needle  Needle type: pencil-point   Needle gauge: 24 G  Free flowing CSF: yes    Assessment  Sensory level: T6 bilateral  Block outcome: patient comfortable  Procedure assessment: patient tolerated procedure well with no immediate complications  Additional Notes  1% lidocaine skin wheal to level, 20 g introducer with pencan

## 2025-02-26 NOTE — ANESTHESIA PREPROCEDURE EVALUATION
Patient: Cindy Deluna    Procedure Information       Date/Time: 02/26/25 0705    Procedure: Arthroplasty Total Hip DIRECT  Anterior Approach (Right: Hip) - 23 HR OBS/ BEDDED OP, C-ARM, GERMAINE , HANA TABLE,2ND ASSIST    Location: [facility] / [facility]    Surgeons: Rey Ivey MD            Relevant Problems   Endocrine   (+) Hypothyroidism   (+) Obesity      Musculoskeletal   (+) Unilateral primary osteoarthritis, right hip       Clinical information reviewed:   Tobacco  Allergies  Meds   Med Hx  Surg Hx  OB Status  Fam Hx  Soc   Hx        NPO Detail:  NPO/Void Status  Carbohydrate Drink Given Prior to Surgery? : N  Date of Last Liquid: 02/25/25  Time of Last Liquid: 2230  Date of Last Solid: 02/25/25  Time of Last Solid: 2000  Last Intake Type: Clear fluids  Time of Last Void: 0609         Physical Exam    Airway  Mallampati: II     Cardiovascular   Rhythm: regular  Rate: normal     Dental    Pulmonary - normal exam     Abdominal - normal exam             Anesthesia Plan    History of general anesthesia?: yes  History of complications of general anesthesia?: no    ASA 3     spinal, MAC and regional     intravenous induction   Postoperative administration of opioids is intended.  Anesthetic plan and risks discussed with patient.      
Skin normal color for race, warm, dry and intact. No evidence of rash.

## 2025-02-26 NOTE — H&P
[facility] Critical Care Medicine       Date:  2025  Patient:  Cindy Deluna  YOB: 1970  MRN:  [id number]   Admit Date:  2025  ========================================================================================================    No chief complaint on file.        History of Present Illness:  Cindy Deluna is a 54 y.o. year old female patient with Past Medical History of chronic back pain, bilateral osteoarthritis of the hips, hypothyroidism 2/2 Hashimotos disease, and anxiety who is POD#0 right total hip arthroplasty, reported to have approx 2L EBL intraoperative, in PACU was hypotensive, given PRBC x2 without improvement and subsequently initiated on Neosynephrine infusion, admitted to ICU for management of hemorrhagic shock.     Interval ICU Events:  : Admitted to ICU, A/Ox4, complaint of nausea, right hip pain 5/10. PRBCx2 transfused in PACU, H/H drawn but thought to be premature Hgb 10.8, will recheck H/H prior to continuing blood transfusion. FFP x2 ordered, Geovani gtt 0.5mcg, wean as tolerated for MAP >65.    Medical History:  Past Medical History:   Diagnosis Date    Anxiety     Arthritis     Fibroid     Hashimoto's disease     Joint pain     PONV (postoperative nausea and vomiting)     Snores      Past Surgical History:   Procedure Laterality Date     SECTION, LOW TRANSVERSE       SECTION, LOW TRANSVERSE      COLONOSCOPY      HYSTERECTOMY      WISDOM TOOTH EXTRACTION       Medications Prior to Admission   Medication Sig Dispense Refill Last Dose/Taking    celecoxib (CeleBREX) 200 mg capsule Take 1 capsule (200 mg) by mouth once daily. 60 capsule 3 Past Week    chlorhexidine (Peridex) 0.12 % solution Use 15 mL in the mouth or throat if needed for wound care for up to 14 days. Use the night before and morning of upcoming orthopedic surgery 120 mL 0 2025 at  4:15 AM    cholecalciferol (Vitamin D-3) 50 mcg (2,000 unit) capsule Take 1 capsule (50 mcg)  "by mouth once daily with breakfast.   Past Week    escitalopram (Lexapro) 5 mg tablet Take 1.5 tablets (7.5 mg) by mouth once daily.   2/25/2025 Evening    levothyroxine (Synthroid, Levoxyl) 112 mcg tablet Take 1 tablet (112 mcg) by mouth once daily.   2/25/2025 Morning    multivitamin with minerals iron-free (Multivitamin 50 Plus) Take 1 tablet by mouth once daily.   Past Month    diclofenac (Voltaren) 75 mg EC tablet Take 1 tablet (75 mg) by mouth 2 times daily (morning and late afternoon).       fluticasone (Flonase) 50 mcg/actuation nasal spray 2 sprays by Does not apply route once daily. (Patient not taking: Reported on 2/26/2025)   More than a month    meloxicam (Mobic) 15 mg tablet Take 1 tablet (15 mg) by mouth early in the morning..       metoclopramide (Reglan) 10 mg tablet Take 1 tablet by mouth every 6 hours as needed (headaches) for up to 7 days. (Patient not taking: Reported on 2/12/2025)       naloxone (Narcan) 4 mg/0.1 mL nasal spray Instill 1 spray intranasally for opioid overdose; repeat in 5 minutes if no response. (Patient not taking: Reported on 2/26/2025) 2 each 0 Not Taking     Doxycycline and Lactose  Social History     Tobacco Use    Smoking status: Former     Types: Cigarettes     Passive exposure: Past    Smokeless tobacco: Never   Vaping Use    Vaping status: Never Used   Substance Use Topics    Alcohol use: Not Currently    Drug use: Defer     No family history on file.    Review of Systems:  14 point review of systems was completed and negative except for those specially mention in my HPI    Physical Exam:    Heart Rate:  [64-96]   Temp:  [36 °C (96.8 °F)-36.7 °C (98.1 °F)]   Resp:  [9-20]   BP: ()/(49-97)   Height:  [167.6 cm (5' 6\")]   Weight:  [94.1 kg (207 lb 7.3 oz)]   SpO2:  [94 %-100 %]     Physical Exam  Vitals and nursing note reviewed.   Constitutional:       General: She is not in acute distress.     Appearance: She is not toxic-appearing.      Interventions: Nasal " cannula in place.   HENT:      Mouth/Throat:      Pharynx: Oropharynx is clear.   Eyes:      Extraocular Movements: Extraocular movements intact.      Pupils: Pupils are equal, round, and reactive to light.   Cardiovascular:      Rate and Rhythm: Normal rate and regular rhythm.      Pulses: Normal pulses.      Heart sounds: Normal heart sounds.   Pulmonary:      Effort: Pulmonary effort is normal.      Breath sounds: Normal breath sounds.   Abdominal:      General: Abdomen is flat.      Palpations: Abdomen is soft.   Musculoskeletal:         General: Normal range of motion.      Right lower leg: No edema.      Left lower leg: No edema.      Comments: Right hip incision without drainage   Skin:     General: Skin is warm.      Capillary Refill: Capillary refill takes less than 2 seconds.      Coloration: Skin is pale.   Neurological:      General: No focal deficit present.      Mental Status: She is alert and oriented to person, place, and time.         Objective:    I have reviewed all medications, laboratory results, and imaging pertinent for today's encounter    Assessment/Plan:    I am currently managing this critically ill patient for the following problems:    Neuro/Psych/Pain Ctrl/Sedation:  Acute postoperative pain  Chronic back pain  Analgesia: Tylenol Q6, PRN Flexeril, Fentanyl, Oxy   Continue home Lexapro    Respiratory/ENT:  Post-op respiratory insufficiency  Wean NC supplemental O2 as tolerated    Cardiovascular:  Shock hypovolemic/hemorrhagic posteroperative  Geovani gtt  Wean for MAP >65  Continuous telemetry    GI:  Bedside swallow prior to PO intake  Advance diet as tolerated    Renal/Volume Status (Intra & Extravascular):  No active issues  Monitor UOP  Daily RFP, Mg    Endocrine  Hypothyroidism  Continue home Synthroid    Infectious Disease:  No active issues  Chloe-operative Ancef    Heme/Onc:  Acute blood loss anemia  PRBC x2, FFP x2, TXA  Coag panel pending  Q6 CBC  Monitor for  bleeding    OBGYN/MSK:  Right hip arthroplasty POD#0  Activity as tolerated  Monitor Right hip site  PT/OT    Ethics/Code Status:  Full Code    :  DVT Prophylaxis: None, bleeding  GI Prophylaxis: None  Bowel Regimen: Colace, PRN Dulcolax  Diet: NPO, advance to Regular as tolerated  CVC: None  Tessy: None  Archer: None  Restraints: None  Dispo: ICU, vasopressors    Critical Care Time:  45 minutes spent in preparing to see patient (I.e. review of medical records), evaluation of diagnostics (I.e. labs, imaging, etc.), documentation, discussing plan of care with patient/ family/ caregiver, and/ or coordination of care with multidisciplinary team. Time does not include completion of procedure time.      Sharla Bingham, APRN-CNP

## 2025-02-26 NOTE — INTERVAL H&P NOTE
Interval History and Physical     I have interviewed and examined the patient and reviewed the recent History and Physical.  There have been no changes to the recent H&P documentation.     The patient understands the planned operation and its associated risks and benefits and agrees to proceed.     The surgical consent form has been signed.    Visit Vitals  BP (!) 135/92   Pulse 80   Temp 36.3 °C (97.3 °F) (Temporal)   Resp 18        Rey Ivey MD

## 2025-02-26 NOTE — ANESTHESIA PROCEDURE NOTES
Peripheral Block    Patient location during procedure: pre-op  Start time: 2/26/2025 7:15 AM  End time: 2/26/2025 7:25 AM  Reason for block: at surgeon's request and post-op pain management  Staffing  Performed: attending   Authorized by: Richar Henry DO    Performed by: Richar Henry DO  Preanesthetic Checklist  Completed: patient identified, IV checked, site marked, risks and benefits discussed, surgical consent, monitors and equipment checked, pre-op evaluation and timeout performed   Timeout performed at:   Peripheral Block  Patient position: laying flat  Prep: ChloraPrep  Patient monitoring: heart rate, continuous pulse ox and cardiac monitor  Block type: PENG  Laterality: right  Injection technique: single-shot  Guidance: ultrasound guided  Local infiltration: lidocaine  Infiltration strength: 1 %  Dose: 2 mL  Needle  Needle gauge: 21 G  Needle length: 10 cm  Needle localization: ultrasound guidance     image stored in chart  Assessment  Injection assessment: negative aspiration for heme, no paresthesia on injection, incremental injection and local visualized surrounding nerve on ultrasound  Heart rate change: no  Additional Notes  Time out performed. 20 ml .5 % Ropivacaine used in divided doses. Patient tolerated procedure well.

## 2025-02-26 NOTE — ANESTHESIA POSTPROCEDURE EVALUATION
Patient: Cindy Deluna    Procedure Summary       Date: 02/26/25 Room / Location: [facility] / Virtual [facility]    Anesthesia Start: 0730 Anesthesia Stop: 0955    Procedure: Arthroplasty Total Hip DIRECT  Anterior Approach (Right: Hip) Diagnosis:       Unilateral primary osteoarthritis, right hip      (Unilateral primary osteoarthritis, right hip [M16.11])    Surgeons: Rey Ivey MD Responsible Provider: Richar Henry DO    Anesthesia Type: spinal, MAC, regional ASA Status: 3            Anesthesia Type: spinal, MAC, regional    Vitals Value Taken Time   /62 02/26/25 0955   Temp 36 02/26/25 0955   Pulse 93 02/26/25 0955   Resp 14 02/26/25 0955   SpO2 100 02/26/25 0955       Anesthesia Post Evaluation    Patient location during evaluation: bedside  Patient participation: complete - patient participated  Level of consciousness: sleepy but conscious  Pain score: 0  Pain management: adequate  Airway patency: patent  Cardiovascular status: acceptable (stable on phenylephrine infusion.)  Respiratory status: acceptable and face mask  Hydration status: acceptable  Postoperative Nausea and Vomiting: none        Encounter Notable Events   Notable Event Outcome Phase Comment   Hypotension Ongoing Treatment Intraprocedure phenylephrine infusion to PACU.  fluid resusscitation, 1 unit PRBC ordered

## 2025-02-27 ENCOUNTER — HOME HEALTH ADMISSION (OUTPATIENT)
Dept: HOME HEALTH SERVICES | Facility: HOME HEALTH | Age: 55
End: 2025-02-27
Payer: COMMERCIAL

## 2025-02-27 PROBLEM — R57.8 HEMORRHAGIC SHOCK (MULTI): Status: RESOLVED | Noted: 2025-02-26 | Resolved: 2025-02-27

## 2025-02-27 PROBLEM — M16.11 UNILATERAL PRIMARY OSTEOARTHRITIS, RIGHT HIP: Status: RESOLVED | Noted: 2024-12-05 | Resolved: 2025-02-27

## 2025-02-27 LAB
ANION GAP SERPL CALC-SCNC: 10 MMOL/L (ref 10–20)
BASOPHILS # BLD AUTO: 0.01 X10*3/UL (ref 0–0.1)
BASOPHILS NFR BLD AUTO: 0.1 %
BUN SERPL-MCNC: 19 MG/DL (ref 6–23)
CALCIUM SERPL-MCNC: 8 MG/DL (ref 8.6–10.3)
CHLORIDE SERPL-SCNC: 104 MMOL/L (ref 98–107)
CO2 SERPL-SCNC: 27 MMOL/L (ref 21–32)
CREAT SERPL-MCNC: 0.62 MG/DL (ref 0.5–1.05)
EGFRCR SERPLBLD CKD-EPI 2021: >90 ML/MIN/1.73M*2
EOSINOPHIL # BLD AUTO: 0 X10*3/UL (ref 0–0.7)
EOSINOPHIL NFR BLD AUTO: 0 %
ERYTHROCYTE [DISTWIDTH] IN BLOOD BY AUTOMATED COUNT: 13.6 % (ref 11.5–14.5)
ERYTHROCYTE [DISTWIDTH] IN BLOOD BY AUTOMATED COUNT: 13.6 % (ref 11.5–14.5)
GLUCOSE BLD MANUAL STRIP-MCNC: 124 MG/DL (ref 74–99)
GLUCOSE SERPL-MCNC: 131 MG/DL (ref 74–99)
HCT VFR BLD AUTO: 28.1 % (ref 36–46)
HCT VFR BLD AUTO: 28.1 % (ref 36–46)
HGB BLD-MCNC: 10 G/DL (ref 12–16)
HGB BLD-MCNC: 10 G/DL (ref 12–16)
IMM GRANULOCYTES # BLD AUTO: 0.04 X10*3/UL (ref 0–0.7)
IMM GRANULOCYTES NFR BLD AUTO: 0.4 % (ref 0–0.9)
LYMPHOCYTES # BLD AUTO: 1.15 X10*3/UL (ref 1.2–4.8)
LYMPHOCYTES NFR BLD AUTO: 12.6 %
MAGNESIUM SERPL-MCNC: 1.73 MG/DL (ref 1.6–2.4)
MCH RBC QN AUTO: 31.3 PG (ref 26–34)
MCH RBC QN AUTO: 31.3 PG (ref 26–34)
MCHC RBC AUTO-ENTMCNC: 35.6 G/DL (ref 32–36)
MCHC RBC AUTO-ENTMCNC: 35.6 G/DL (ref 32–36)
MCV RBC AUTO: 88 FL (ref 80–100)
MCV RBC AUTO: 88 FL (ref 80–100)
MONOCYTES # BLD AUTO: 0.68 X10*3/UL (ref 0.1–1)
MONOCYTES NFR BLD AUTO: 7.5 %
NEUTROPHILS # BLD AUTO: 7.22 X10*3/UL (ref 1.2–7.7)
NEUTROPHILS NFR BLD AUTO: 79.4 %
NRBC BLD-RTO: 0 /100 WBCS (ref 0–0)
NRBC BLD-RTO: 0 /100 WBCS (ref 0–0)
PHOSPHATE SERPL-MCNC: 3 MG/DL (ref 2.5–4.9)
PLATELET # BLD AUTO: 97 X10*3/UL (ref 150–450)
PLATELET # BLD AUTO: 97 X10*3/UL (ref 150–450)
POTASSIUM SERPL-SCNC: 4 MMOL/L (ref 3.5–5.3)
RBC # BLD AUTO: 3.19 X10*6/UL (ref 4–5.2)
RBC # BLD AUTO: 3.19 X10*6/UL (ref 4–5.2)
SODIUM SERPL-SCNC: 137 MMOL/L (ref 136–145)
WBC # BLD AUTO: 9.1 X10*3/UL (ref 4.4–11.3)
WBC # BLD AUTO: 9.1 X10*3/UL (ref 4.4–11.3)

## 2025-02-27 PROCEDURE — 83735 ASSAY OF MAGNESIUM: CPT

## 2025-02-27 PROCEDURE — 82947 ASSAY GLUCOSE BLOOD QUANT: CPT

## 2025-02-27 PROCEDURE — 2500000001 HC RX 250 WO HCPCS SELF ADMINISTERED DRUGS (ALT 637 FOR MEDICARE OP): Performed by: NURSE PRACTITIONER

## 2025-02-27 PROCEDURE — 97165 OT EVAL LOW COMPLEX 30 MIN: CPT | Mod: GO

## 2025-02-27 PROCEDURE — 97116 GAIT TRAINING THERAPY: CPT | Mod: GP,CQ

## 2025-02-27 PROCEDURE — RXMED WILLOW AMBULATORY MEDICATION CHARGE

## 2025-02-27 PROCEDURE — 2500000001 HC RX 250 WO HCPCS SELF ADMINISTERED DRUGS (ALT 637 FOR MEDICARE OP): Performed by: STUDENT IN AN ORGANIZED HEALTH CARE EDUCATION/TRAINING PROGRAM

## 2025-02-27 PROCEDURE — 2500000001 HC RX 250 WO HCPCS SELF ADMINISTERED DRUGS (ALT 637 FOR MEDICARE OP): Performed by: ORTHOPAEDIC SURGERY

## 2025-02-27 PROCEDURE — 84100 ASSAY OF PHOSPHORUS: CPT | Performed by: STUDENT IN AN ORGANIZED HEALTH CARE EDUCATION/TRAINING PROGRAM

## 2025-02-27 PROCEDURE — 80048 BASIC METABOLIC PNL TOTAL CA: CPT

## 2025-02-27 PROCEDURE — 2500000002 HC RX 250 W HCPCS SELF ADMINISTERED DRUGS (ALT 637 FOR MEDICARE OP, ALT 636 FOR OP/ED): Performed by: ORTHOPAEDIC SURGERY

## 2025-02-27 PROCEDURE — 2500000002 HC RX 250 W HCPCS SELF ADMINISTERED DRUGS (ALT 637 FOR MEDICARE OP, ALT 636 FOR OP/ED)

## 2025-02-27 PROCEDURE — 82374 ASSAY BLOOD CARBON DIOXIDE: CPT

## 2025-02-27 PROCEDURE — 97530 THERAPEUTIC ACTIVITIES: CPT | Mod: GP,CQ

## 2025-02-27 PROCEDURE — 85025 COMPLETE CBC W/AUTO DIFF WBC: CPT

## 2025-02-27 PROCEDURE — 2500000004 HC RX 250 GENERAL PHARMACY W/ HCPCS (ALT 636 FOR OP/ED)

## 2025-02-27 PROCEDURE — 2500000004 HC RX 250 GENERAL PHARMACY W/ HCPCS (ALT 636 FOR OP/ED): Performed by: ORTHOPAEDIC SURGERY

## 2025-02-27 PROCEDURE — 1100000001 HC PRIVATE ROOM DAILY

## 2025-02-27 PROCEDURE — 99233 SBSQ HOSP IP/OBS HIGH 50: CPT | Performed by: NURSE PRACTITIONER

## 2025-02-27 PROCEDURE — 2500000004 HC RX 250 GENERAL PHARMACY W/ HCPCS (ALT 636 FOR OP/ED): Performed by: STUDENT IN AN ORGANIZED HEALTH CARE EDUCATION/TRAINING PROGRAM

## 2025-02-27 PROCEDURE — 36415 COLL VENOUS BLD VENIPUNCTURE: CPT

## 2025-02-27 PROCEDURE — 97161 PT EVAL LOW COMPLEX 20 MIN: CPT | Mod: GP

## 2025-02-27 RX ORDER — CYCLOBENZAPRINE HCL 10 MG
10 TABLET ORAL 3 TIMES DAILY PRN
Qty: 21 TABLET | Refills: 0 | Status: SHIPPED | OUTPATIENT
Start: 2025-02-27 | End: 2025-03-07

## 2025-02-27 RX ORDER — OXYCODONE HYDROCHLORIDE 5 MG/1
5 TABLET ORAL EVERY 6 HOURS PRN
Qty: 28 TABLET | Refills: 0 | Status: SHIPPED | OUTPATIENT
Start: 2025-02-27 | End: 2025-03-07

## 2025-02-27 RX ORDER — MAGNESIUM SULFATE HEPTAHYDRATE 40 MG/ML
2 INJECTION, SOLUTION INTRAVENOUS ONCE
Status: COMPLETED | OUTPATIENT
Start: 2025-02-27 | End: 2025-02-27

## 2025-02-27 RX ORDER — ACETAMINOPHEN 325 MG/1
650 TABLET ORAL EVERY 6 HOURS SCHEDULED
Qty: 56 TABLET | Refills: 0 | Status: SHIPPED | OUTPATIENT
Start: 2025-02-27 | End: 2025-03-07

## 2025-02-27 RX ORDER — ASPIRIN 81 MG/1
81 TABLET ORAL 2 TIMES DAILY
Qty: 60 TABLET | Refills: 0 | Status: SHIPPED | OUTPATIENT
Start: 2025-02-27 | End: 2025-03-01 | Stop reason: HOSPADM

## 2025-02-27 RX ORDER — ASPIRIN 81 MG/1
81 TABLET ORAL 2 TIMES DAILY
Status: DISCONTINUED | OUTPATIENT
Start: 2025-02-27 | End: 2025-02-28

## 2025-02-27 RX ORDER — DOCUSATE SODIUM 100 MG/1
100 CAPSULE, LIQUID FILLED ORAL 2 TIMES DAILY
Qty: 20 CAPSULE | Refills: 0 | Status: SHIPPED | OUTPATIENT
Start: 2025-02-27 | End: 2025-03-10

## 2025-02-27 RX ADMIN — OXYCODONE 5 MG: 5 TABLET ORAL at 17:11

## 2025-02-27 RX ADMIN — ESCITALOPRAM 7.5 MG: 5 TABLET, FILM COATED ORAL at 20:19

## 2025-02-27 RX ADMIN — CEFAZOLIN SODIUM 2 G: 2 INJECTION, SOLUTION INTRAVENOUS at 00:04

## 2025-02-27 RX ADMIN — DOCUSATE SODIUM 100 MG: 100 CAPSULE, LIQUID FILLED ORAL at 20:20

## 2025-02-27 RX ADMIN — TRANEXAMIC ACID 1950 MG: 650 TABLET ORAL at 06:13

## 2025-02-27 RX ADMIN — ACETAMINOPHEN 650 MG: 325 TABLET ORAL at 11:16

## 2025-02-27 RX ADMIN — OXYCODONE 5 MG: 5 TABLET ORAL at 11:52

## 2025-02-27 RX ADMIN — PROCHLORPERAZINE EDISYLATE 10 MG: 5 INJECTION INTRAMUSCULAR; INTRAVENOUS at 11:52

## 2025-02-27 RX ADMIN — LEVOTHYROXINE SODIUM 112 MCG: 112 TABLET ORAL at 06:13

## 2025-02-27 RX ADMIN — OXYCODONE 5 MG: 5 TABLET ORAL at 20:20

## 2025-02-27 RX ADMIN — DOCUSATE SODIUM 100 MG: 100 CAPSULE, LIQUID FILLED ORAL at 09:13

## 2025-02-27 RX ADMIN — FENTANYL CITRATE 50 MCG: 50 INJECTION INTRAMUSCULAR; INTRAVENOUS at 02:31

## 2025-02-27 RX ADMIN — MAGNESIUM SULFATE HEPTAHYDRATE 2 G: 40 INJECTION, SOLUTION INTRAVENOUS at 07:00

## 2025-02-27 RX ADMIN — ONDANSETRON 4 MG: 2 INJECTION INTRAMUSCULAR; INTRAVENOUS at 06:13

## 2025-02-27 RX ADMIN — OXYCODONE 5 MG: 5 TABLET ORAL at 07:45

## 2025-02-27 RX ADMIN — ASPIRIN 81 MG: 81 TABLET, COATED ORAL at 11:17

## 2025-02-27 ASSESSMENT — COGNITIVE AND FUNCTIONAL STATUS - GENERAL
TURNING FROM BACK TO SIDE WHILE IN FLAT BAD: A LITTLE
WALKING IN HOSPITAL ROOM: A LITTLE
CLIMB 3 TO 5 STEPS WITH RAILING: A LOT
MOBILITY SCORE: 16
TURNING FROM BACK TO SIDE WHILE IN FLAT BAD: A LITTLE
DRESSING REGULAR LOWER BODY CLOTHING: A LOT
MOBILITY SCORE: 17
MOVING TO AND FROM BED TO CHAIR: A LITTLE
MOVING FROM LYING ON BACK TO SITTING ON SIDE OF FLAT BED WITH BEDRAILS: A LITTLE
MOVING TO AND FROM BED TO CHAIR: A LITTLE
HELP NEEDED FOR BATHING: A LITTLE
CLIMB 3 TO 5 STEPS WITH RAILING: A LOT
WALKING IN HOSPITAL ROOM: A LITTLE
DAILY ACTIVITIY SCORE: 19
STANDING UP FROM CHAIR USING ARMS: A LOT
STANDING UP FROM CHAIR USING ARMS: A LITTLE
TOILETING: A LITTLE
MOVING FROM LYING ON BACK TO SITTING ON SIDE OF FLAT BED WITH BEDRAILS: A LITTLE
PERSONAL GROOMING: A LITTLE

## 2025-02-27 ASSESSMENT — PAIN DESCRIPTION - LOCATION
LOCATION: HIP
LOCATION: HIP

## 2025-02-27 ASSESSMENT — PAIN - FUNCTIONAL ASSESSMENT
PAIN_FUNCTIONAL_ASSESSMENT: WONG-BAKER FACES
PAIN_FUNCTIONAL_ASSESSMENT: 0-10

## 2025-02-27 ASSESSMENT — PAIN SCALES - GENERAL
PAINLEVEL_OUTOF10: 5 - MODERATE PAIN
PAINLEVEL_OUTOF10: 2
PAINLEVEL_OUTOF10: 5 - MODERATE PAIN
PAINLEVEL_OUTOF10: 6
PAINLEVEL_OUTOF10: 2

## 2025-02-27 ASSESSMENT — PAIN SCALES - PAIN ASSESSMENT IN ADVANCED DEMENTIA (PAINAD)
TOTALSCORE: MEDICATION (SEE MAR)
TOTALSCORE: MEDICATION (SEE MAR)

## 2025-02-27 ASSESSMENT — PAIN DESCRIPTION - DESCRIPTORS
DESCRIPTORS: BURNING
DESCRIPTORS: ACHING;BURNING
DESCRIPTORS: ACHING;BURNING

## 2025-02-27 ASSESSMENT — PAIN SCALES - WONG BAKER: WONGBAKER_NUMERICALRESPONSE: HURTS LITTLE BIT

## 2025-02-27 ASSESSMENT — PAIN DESCRIPTION - ORIENTATION
ORIENTATION: RIGHT
ORIENTATION: RIGHT

## 2025-02-27 ASSESSMENT — ACTIVITIES OF DAILY LIVING (ADL)
LACK_OF_TRANSPORTATION: NO
BATHING_ASSISTANCE: MODERATE

## 2025-02-27 NOTE — PROGRESS NOTES
02/27/25 1314   Discharge Planning   Living Arrangements Spouse/significant other   Support Systems Spouse/significant other   Assistance Needed independent   Type of Residence Private residence   Number of Stairs to Enter Residence 4   Number of Stairs Within Residence 12   Do you have animals or pets at home? Yes   Type of Animals or Pets 1 dog and 2 cats   Home or Post Acute Services None   Expected Discharge Disposition Home   Financial Resource Strain   How hard is it for you to pay for the very basics like food, housing, medical care, and heating? Not hard   Housing Stability   In the last 12 months, was there a time when you were not able to pay the mortgage or rent on time? N   In the past 12 months, how many times have you moved where you were living? 0   At any time in the past 12 months, were you homeless or living in a shelter (including now)? N   Transportation Needs   In the past 12 months, has lack of transportation kept you from meetings, work, or from getting things needed for daily living? No   Patient Choice   Provider Choice list and CMS website (https://medicare.gov/care-compare#search) for post-acute Quality and Resource Measure Data were provided and reviewed with: Patient   Intensity of Service   Intensity of Service 0-30 min     Chart reviewed, writer spoke with patient- introduced self and explained role. Patient sitting up in lounge chair. She is alert and oriented x3. Writer discussed discharge needs/ concerns. She is elective total hip. She plans to return home. Spouse took time off work and will be home with patient. She prefers home with [facility]/ preference is [facility]. Plans to do outpatient PT at  [facility] after [facility].  Demographics confirmed. Patient reports if HHC calls to setup time while in the hospital need to call spouse at [phone] as she left her phone at home which is [phone]. Care Transition team to continue to follow and assist as needed. FERNANDA Fernández

## 2025-02-27 NOTE — PROGRESS NOTES
Physical Therapy    Physical Therapy Treatment    Patient Name: Cindy Deluna  MRN: [id number]  Today's Date: 2/27/2025  Time Calculation  Start Time: 1325  Stop Time: 1356  Time Calculation (min): 31 min     11/11-A    Assessment/Plan   PT Assessment  PT Assessment Results: Decreased strength, Decreased range of motion, Decreased endurance, Impaired balance, Decreased mobility, Pain  Rehab Prognosis: Good  Barriers to Discharge Home:  (stairs)  Evaluation/Treatment Tolerance: Patient tolerated treatment well  Medical Staff Made Aware: Yes  Strengths: Ability to acquire knowledge  End of Session Communication: Bedside nurse  Assessment Comment: pt participating  well, would benefit from continued therapy to increase functionalmobility and to practice navigating stairs  End of Session Patient Position: Up in chair, Alarm off, not on at start of session     Treatment/Interventions: Bed mobility, Transfer training, Gait training, Stair training, Strengthening, Therapeutic exercise, Therapeutic activity  PT Plan: Ongoing PT  PT Frequency: BID  PT Discharge Recommendations: Low intensity level of continued care  Equipment Recommended upon Discharge:  (patient reports she has the equipment she needs at home)   PT Recommended Transfer Status: Assist x1    General Visit Information:   PT  Visit  PT Received On: 02/27/25  General  Reason for Referral: R THR  Family/Caregiver Present: No  Prior to Session Communication: Bedside nurse (cleared to participate)  Patient Position Received: Bed, 4 rail up, Alarm off, not on at start of session  General Comment: agreeable to participate , denies  any home going concerns, stating that her spouse will be  available to help    General Observations:   General Observation: pt supine with  foot of bed elefvated  upon arrival, discussed  importance of keeping bed flat and no pillows under knees  in  order to avoid tight hip flexors    Subjective     Precautions:  Precautions  LE  Weight Bearing Status: Weight Bearing as Tolerated  Medical Precautions: Fall precautions  Post-Surgical Precautions: Right hip precautions (anterior  hip precautions  R LE)  Precautions Comment: pt able  to recall 2 hip precautions , able  to maintain throughout treatment         Objective     Pain:  Pain Assessment  Pain Assessment: 0-10  0-10 (Numeric) Pain Score: 5 - Moderate pain (decreased to 4/10 at end of session with CP  in place)  Pain Type: Acute pain, Surgical pain  Pain Location: Hip  Pain Orientation: Right  Pain Descriptors: Aching, Burning  Pain Interventions: Cold applied    Cognition:  Cognition  Overall Cognitive Status: Within Functional Limits      Activity Tolerance:  Activity Tolerance  Endurance: Endurance does not limit participation in activity    Treatments:  Therapeutic Exercise  Therapeutic Exercise Performed: Yes  Therapeutic Exercise Activity 1: hip precautions maintained while Pt performed supine ther ex including ankle pumps, quad sets, glut sets, heel slides, SAQ 10-20 reps.  Therapeutic Exercise Activity 2: hip precautions maintianed while Pt performed seated ther ex  including LAQ, ABD/ADD pillow squeeze and Heel/toe raises 10-20 reps. Verbal and tactile cues for technique and breathing.     Bed Mobility  Bed Mobility: Yes  Bed Mobility 1  Bed Mobility 1: Supine to sitting  Bed Mobility Comments 1: transfers supine to sit with Min A for R LE , bed flat without use of bedrails  Ambulation/Gait Training  Ambulation/Gait Training Performed: Yes (ambulating 5 ft with WW and CGA using slow step to gait pattern improving to step through gait .  distance limited by lines)  Transfers  Transfer: Yes  Transfer 1  Technique 1: Sit to stand, Stand to sit  Trials/Comments 1: transfers sit to stand with WW and CGA with vc cues for hand placement. Pt  receptive to instruction  Transfers 2  Trials/Comments 2: with WW  Stairs  Stairs: No          Outcome Measures:     [facility] Basic Mobility  Turning  from your back to your side while in a flat bed without using bedrails: A little  Moving from lying on your back to sitting on the side of a flat bed without using bedrails: A little  Moving to and from bed to chair (including a wheelchair): A little  Standing up from a chair using your arms (e.g. wheelchair or bedside chair): A little  To walk in hospital room: A little  Climbing 3-5 steps with railing: A lot  Basic Mobility - Total Score: 17         EDUCATION:    Individual(s) Educated: Patient  Education Provided: Body Mechanics, Fall Risk, Home Exercise Program, Post-Op Precautions  Patient Response to Education: Patient/Caregiver Verbalized Understanding of Information    Encounter Problems       Encounter Problems (Active)       PT Problem       Patient will complete bed mobility indep without use of bed rails (Progressing)       Start:  02/27/25    Expected End:  03/13/25            Patient will complete functional transfers with wheeled walker MI (Progressing)       Start:  02/27/25    Expected End:  03/13/25            Patient will be indep with HEP for LE ROM and strengthening (Progressing)       Start:  02/27/25    Expected End:  03/13/25            Patient will ambulate 200' with wheeled walker and supervision (Progressing)       Start:  02/27/25    Expected End:  03/13/25                Patient will ascend/descend 12 stairs with on handrail and supervision (Not Progressing)       Start:  02/27/25    Expected End:  03/13/25

## 2025-02-27 NOTE — PROGRESS NOTES
[facility] Critical Care Medicine       Date:  2025  Patient:  Cindy Deluna  YOB: 1970  MRN:  [id number]   Admit Date:  2025  ========================================================================================================    No chief complaint on file.        History of Present Illness:  Cindy Deluna is a 54 y.o. year old female patient with Past Medical History of  chronic back pain, bilateral osteoarthritis of the hips, hypothyroidism 2/2 Hashimotos disease, and anxiety who is POD#0 right total hip arthroplasty, reported to have approx 2L EBL intraoperative, in PACU was hypotensive, given PRBC x2 without improvement and subsequently initiated on Neosynephrine infusion, admitted to ICU for management of hemorrhagic shock.        Interval ICU Events:  : Admitted to ICU, A/Ox4, complaint of nausea, right hip pain 5/10. PRBCx2 transfused in PACU, H/H drawn but thought to be premature Hgb 10.8, will recheck H/H prior to continuing blood transfusion. FFP x2 ordered, Geovani gtt 0.5mcg, wean as tolerated for MAP >65.   : Off vasopressor therapy, VSS, NSR with HR 80-90s, on room air. Hgb 10, plt 97.     Medical History:  Past Medical History:   Diagnosis Date    Anxiety     Arthritis     Fibroid     Hashimoto's disease     Joint pain     PONV (postoperative nausea and vomiting)     Snores      Past Surgical History:   Procedure Laterality Date     SECTION, LOW TRANSVERSE       SECTION, LOW TRANSVERSE      COLONOSCOPY      HYSTERECTOMY      WISDOM TOOTH EXTRACTION       Medications Prior to Admission   Medication Sig Dispense Refill Last Dose/Taking    celecoxib (CeleBREX) 200 mg capsule Take 1 capsule (200 mg) by mouth once daily. 60 capsule 3 Past Week    chlorhexidine (Peridex) 0.12 % solution Use 15 mL in the mouth or throat if needed for wound care for up to 14 days. Use the night before and morning of upcoming orthopedic surgery 120 mL 0 2025 at   4:15 AM    cholecalciferol (Vitamin D-3) 50 mcg (2,000 unit) capsule Take 1 capsule (50 mcg) by mouth once daily with breakfast.   Past Week    escitalopram (Lexapro) 5 mg tablet Take 1.5 tablets (7.5 mg) by mouth once daily.   2/25/2025 Evening    levothyroxine (Synthroid, Levoxyl) 112 mcg tablet Take 1 tablet (112 mcg) by mouth once daily.   2/25/2025 Morning    multivitamin with minerals iron-free (Multivitamin 50 Plus) Take 1 tablet by mouth once daily.   Past Month    diclofenac (Voltaren) 75 mg EC tablet Take 1 tablet (75 mg) by mouth 2 times daily (morning and late afternoon). (Patient not taking: Reported on 2/26/2025)   Not Taking    fluticasone (Flonase) 50 mcg/actuation nasal spray 2 sprays by Does not apply route once daily. (Patient not taking: Reported on 2/26/2025)   More than a month    meloxicam (Mobic) 15 mg tablet Take 1 tablet (15 mg) by mouth early in the morning.. (Patient not taking: Reported on 2/26/2025)   Not Taking    metoclopramide (Reglan) 10 mg tablet Take 1 tablet by mouth every 6 hours as needed (headaches) for up to 7 days. (Patient not taking: Reported on 2/12/2025)       naloxone (Narcan) 4 mg/0.1 mL nasal spray Instill 1 spray intranasally for opioid overdose; repeat in 5 minutes if no response. (Patient not taking: Reported on 2/26/2025) 2 each 0 Not Taking     Doxycycline and Lactose  Social History     Tobacco Use    Smoking status: Former     Types: Cigarettes     Passive exposure: Past    Smokeless tobacco: Never   Vaping Use    Vaping status: Never Used   Substance Use Topics    Alcohol use: Not Currently    Drug use: Defer     No family history on file.    Review of Systems:  14 point review of systems was completed and negative except for those specially mention in my HPI    Physical Exam:    Heart Rate:  []   Temp:  [35.8 °C (96.4 °F)-36.7 °C (98.1 °F)]   Resp:  [9-20]   BP: ()/(48-97)   Weight:  [94.1 kg (207 lb 7.3 oz)]   SpO2:  [90 %-100 %]     Physical  Exam  HENT:      Head: Normocephalic.      Mouth/Throat:      Mouth: Mucous membranes are moist.   Eyes:      Extraocular Movements: Extraocular movements intact.      Conjunctiva/sclera: Conjunctivae normal.   Cardiovascular:      Rate and Rhythm: Normal rate and regular rhythm.      Pulses: Normal pulses.      Heart sounds: Normal heart sounds.   Pulmonary:      Effort: Pulmonary effort is normal.      Breath sounds: Normal breath sounds.   Abdominal:      General: Bowel sounds are normal.      Palpations: Abdomen is soft.   Musculoskeletal:      Right lower leg: Edema present.      Comments: Right hip spica dressing   Skin:     General: Skin is warm.      Capillary Refill: Capillary refill takes less than 2 seconds.   Neurological:      General: No focal deficit present.      Mental Status: She is alert.   Psychiatric:         Mood and Affect: Mood normal.         Behavior: Behavior normal.         Objective:    I have reviewed all medications, laboratory results, and imaging pertinent for today's encounter    Assessment/Plan:    I am currently managing this critically ill patient for the following problems:    Neuro/Psych/Pain Ctrl/Sedation:  Acute postoperative pain  Chronic back pain  Analgesia: Tylenol Q6, PRN Flexeril, Fentanyl, Oxy   home Lexapro     Respiratory/ENT:  Post-op respiratory insufficiency  Oxygen therapy as needed to maintain SPO2 greater than 92%, wean as able  Currently on room air     Cardiovascular:  Shock hypovolemic/hemorrhagic posteroperative  Geovani gtt-off  Hemodynamically stable     GI:  Advance diet as tolerated     Renal/Volume Status (Intra & Extravascular):  No active issues  Monitor UOP  Daily RFP, Mg    Endocrine  Hypothyroidism  home Synthroid     Infectious Disease:  No active issues  Chloe-operative Ancef     Heme/Onc:  Acute blood loss anemia  PRBC x2, FFP x2, TXA  Monitor for bleeding  Trend CBC  DVTp per ortho     OBGYN/MSK:  Right hip arthroplasty POD#1  Monitor Right hip  site, no s/s of bleeding  PT/OT     Ethics/Code Status:  Full Code     :  DVT Prophylaxis: None, bleeding  GI Prophylaxis: None  Bowel Regimen: Colace, PRN Dulcolax  Diet: advance as tolerated  CVC: None  [place]: None  Archer: None  Restraints: None  Dispo: transfer    35 minutes spent in preparing to see patient (I.e. review of medical records), evaluation of diagnostics (I.e. labs, imaging, etc.), documentation, discussing plan of care with patient/ family/ caregiver, and/ or coordination of care with multidisciplinary team. Time does not include completion of procedure time.     Plan discussed with Dr. Adrien Ball, APRN-CNP

## 2025-02-27 NOTE — PROGRESS NOTES
Hip surgery    Progress Note    Subjective:     Post-Operative Day: 1 Status Post right Hip Arthroplasty  Systemic or Specific Complaints:No Complaints    Objective:     Visit Vitals  /71 (BP Location: Left arm, Patient Position: Lying)   Pulse 92   Temp 36.2 °C (97.2 °F) (Temporal)   Resp 14       General: alert and oriented, in no acute distress, appears stated age, and cooperative   Wound: no erythema, no edema, no drainage, and ace spica dressing and surgical dressing CDI   Motion: Painful range of Motion   DVT Exam: No evidence of DVT seen on physical exam.  Negative Pilar's sign.  No significant calf/ankle edema.       NVI in lower extremity. Thigh swollen but soft. Moving foot and ankle.      Data Review  Recent Results (from the past 24 hours)   CBC and Auto Differential    Collection Time: 02/26/25 11:27 AM   Result Value Ref Range    WBC 10.3 4.4 - 11.3 x10*3/uL    nRBC 0.0 0.0 - 0.0 /100 WBCs    RBC 3.55 (L) 4.00 - 5.20 x10*6/uL    Hemoglobin 10.8 (L) 12.0 - 16.0 g/dL    Hematocrit 32.6 (L) 36.0 - 46.0 %    MCV 92 80 - 100 fL    MCH 30.4 26.0 - 34.0 pg    MCHC 33.1 32.0 - 36.0 g/dL    RDW 13.1 11.5 - 14.5 %    Platelets 114 (L) 150 - 450 x10*3/uL    Neutrophils % 75.6 40.0 - 80.0 %    Immature Granulocytes %, Automated 0.5 0.0 - 0.9 %    Lymphocytes % 17.2 13.0 - 44.0 %    Monocytes % 5.5 2.0 - 10.0 %    Eosinophils % 1.0 0.0 - 6.0 %    Basophils % 0.2 0.0 - 2.0 %    Neutrophils Absolute 7.77 (H) 1.20 - 7.70 x10*3/uL    Immature Granulocytes Absolute, Automated 0.05 0.00 - 0.70 x10*3/uL    Lymphocytes Absolute 1.77 1.20 - 4.80 x10*3/uL    Monocytes Absolute 0.56 0.10 - 1.00 x10*3/uL    Eosinophils Absolute 0.10 0.00 - 0.70 x10*3/uL    Basophils Absolute 0.02 0.00 - 0.10 x10*3/uL   Lavender Top    Collection Time: 02/26/25 11:27 AM   Result Value Ref Range    Extra Tube Hold for add-ons.    Prepare RBC: 2 Units    Collection Time: 02/26/25 11:32 AM   Result Value Ref Range    PRODUCT CODE  R4876N84     Unit Number [id number]     Unit ABO O     Unit RH POS     XM INTEP COMP     Dispense Status RE     Blood Expiration Date 3/15/2025 11:59:00 PM EDT     PRODUCT BLOOD TYPE 5100     UNIT VOLUME 350     PRODUCT CODE T0646O20     Unit Number [id number]     Unit ABO O     Unit RH POS     XM INTEP COMP     Dispense Status XM     Blood Expiration Date 3/14/2025 11:59:00 PM EDT     PRODUCT BLOOD TYPE 5100     UNIT VOLUME 350    Prepare Plasma: 2 Units    Collection Time: 02/26/25 11:33 AM   Result Value Ref Range    PRODUCT CODE Z4616F99     Unit Number [id number]     Unit ABO AB     Unit RH POS     Dispense Status TR     Blood Expiration Date 2/27/2025  9:19:00 PM EST     PRODUCT BLOOD TYPE 8400     UNIT VOLUME 212     PRODUCT CODE N2496U48     Unit Number [id number]     Unit ABO AB     Unit RH POS     Dispense Status XM     Blood Expiration Date 2/27/2025  9:22:00 PM EST     PRODUCT BLOOD TYPE 8400     UNIT VOLUME 209    CBC    Collection Time: 02/26/25  2:44 PM   Result Value Ref Range    WBC 11.6 (H) 4.4 - 11.3 x10*3/uL    nRBC 0.0 0.0 - 0.0 /100 WBCs    RBC 3.45 (L) 4.00 - 5.20 x10*6/uL    Hemoglobin 10.5 (L) 12.0 - 16.0 g/dL    Hematocrit 30.9 (L) 36.0 - 46.0 %    MCV 90 80 - 100 fL    MCH 30.4 26.0 - 34.0 pg    MCHC 34.0 32.0 - 36.0 g/dL    RDW 13.4 11.5 - 14.5 %    Platelets 124 (L) 150 - 450 x10*3/uL   Coagulation Screen    Collection Time: 02/26/25 10:56 PM   Result Value Ref Range    Protime 13.5 (H) 9.8 - 12.4 seconds    INR 1.2 (H) 0.9 - 1.1    aPTT 25 (L) 26 - 36 seconds   CBC    Collection Time: 02/26/25 10:56 PM   Result Value Ref Range    WBC 8.8 4.4 - 11.3 x10*3/uL    nRBC 0.0 0.0 - 0.0 /100 WBCs    RBC 3.25 (L) 4.00 - 5.20 x10*6/uL    Hemoglobin 9.9 (L) 12.0 - 16.0 g/dL    Hematocrit 29.1 (L) 36.0 - 46.0 %    MCV 90 80 - 100 fL    MCH 30.5 26.0 - 34.0 pg    MCHC 34.0 32.0 - 36.0 g/dL    RDW 13.6 11.5 - 14.5 %    Platelets 99 (L) 150 - 450 x10*3/uL   CBC    Collection Time:  02/27/25  4:21 AM   Result Value Ref Range    WBC 9.1 4.4 - 11.3 x10*3/uL    nRBC 0.0 0.0 - 0.0 /100 WBCs    RBC 3.19 (L) 4.00 - 5.20 x10*6/uL    Hemoglobin 10.0 (L) 12.0 - 16.0 g/dL    Hematocrit 28.1 (L) 36.0 - 46.0 %    MCV 88 80 - 100 fL    MCH 31.3 26.0 - 34.0 pg    MCHC 35.6 32.0 - 36.0 g/dL    RDW 13.6 11.5 - 14.5 %    Platelets 97 (L) 150 - 450 x10*3/uL   CBC and Auto Differential    Collection Time: 02/27/25  4:21 AM   Result Value Ref Range    WBC 9.1 4.4 - 11.3 x10*3/uL    nRBC 0.0 0.0 - 0.0 /100 WBCs    RBC 3.19 (L) 4.00 - 5.20 x10*6/uL    Hemoglobin 10.0 (L) 12.0 - 16.0 g/dL    Hematocrit 28.1 (L) 36.0 - 46.0 %    MCV 88 80 - 100 fL    MCH 31.3 26.0 - 34.0 pg    MCHC 35.6 32.0 - 36.0 g/dL    RDW 13.6 11.5 - 14.5 %    Platelets 97 (L) 150 - 450 x10*3/uL    Neutrophils % 79.4 40.0 - 80.0 %    Immature Granulocytes %, Automated 0.4 0.0 - 0.9 %    Lymphocytes % 12.6 13.0 - 44.0 %    Monocytes % 7.5 2.0 - 10.0 %    Eosinophils % 0.0 0.0 - 6.0 %    Basophils % 0.1 0.0 - 2.0 %    Neutrophils Absolute 7.22 1.20 - 7.70 x10*3/uL    Immature Granulocytes Absolute, Automated 0.04 0.00 - 0.70 x10*3/uL    Lymphocytes Absolute 1.15 (L) 1.20 - 4.80 x10*3/uL    Monocytes Absolute 0.68 0.10 - 1.00 x10*3/uL    Eosinophils Absolute 0.00 0.00 - 0.70 x10*3/uL    Basophils Absolute 0.01 0.00 - 0.10 x10*3/uL   Basic Metabolic Panel    Collection Time: 02/27/25  4:21 AM   Result Value Ref Range    Glucose 131 (H) 74 - 99 mg/dL    Sodium 137 136 - 145 mmol/L    Potassium 4.0 3.5 - 5.3 mmol/L    Chloride 104 98 - 107 mmol/L    Bicarbonate 27 21 - 32 mmol/L    Anion Gap 10 10 - 20 mmol/L    Urea Nitrogen 19 6 - 23 mg/dL    Creatinine 0.62 0.50 - 1.05 mg/dL    eGFR >90 >60 mL/min/1.73m*2    Calcium 8.0 (L) 8.6 - 10.3 mg/dL   Magnesium    Collection Time: 02/27/25  4:21 AM   Result Value Ref Range    Magnesium 1.73 1.60 - 2.40 mg/dL   Phosphorus    Collection Time: 02/27/25  4:21 AM   Result Value Ref Range    Phosphorus 3.0 2.5 -  4.9 mg/dL   POCT GLUCOSE    Collection Time: 02/27/25  7:12 AM   Result Value Ref Range    POCT Glucose 124 (H) 74 - 99 mg/dL     XR pelvis 1-2 views    Result Date: 2/26/2025  Interpreted By:  Jay Jay Phan, STUDY: XR PELVIS 1-2 VIEWS;  2/26/2025 10:30 am   INDICATION: Signs/Symptoms:Post op hip.     COMPARISON: Pre-surgical Right hip and/or pelvis series, 26 November 2024   ACCESSION NUMBER(S): [id number]   ORDERING CLINICIAN: DIDI HORTON   TECHNIQUE: Frontal view of the right hip obtained portably in the immediate or near-immediate postoperative setting   FINDINGS: No acute unexpected radiographic findings shortly after right hip replacement; refer to the operative report       As above     MACRO: None   Signed by: Jay Jay Phan 2/26/2025 1:56 PM Dictation workstation:   [id number]    FL fluoro images no charge    Result Date: 2/26/2025  These images are not reportable by radiology and will not be interpreted by  Radiologists.    BI mammo bilateral screening tomosynthesis    Result Date: 2/25/2025  * * *Final Report* * * DATE OF EXAM: Feb 25 2025  1:51PM   LNW   [id number]  -  ARTEMIO SCREENING W FREDI  / ACCESSION #  [id number] PROCEDURE REASON: Encounter for screening mammogram for breast cancer      * * * * Physician Interpretation * * * * RESULT: [facility] [address] [id number] Phone: [phone] #[phone] - ARTEMIO SCREENING W FREDI HISTORY: 54 year-old patient seen for screening. No current complaints. Patient states no personal history of breast cancer. COMPARISON STUDIES: The present examination has been compared to prior imaging studies dated 02/28/2019 (mammogram), 07/10/2020 (mammogram), 04/20/2022 (mammogram), 08/22/2023 (mammogram) and 09/30/2023 (mammogram). MAMMOGRAM TECHNIQUE: The study was acquired using full field digital technology and interpreted from soft copy. Digital Breast Tomosynthesis (DBT) images were obtained and used to assist in the  interpretation of this examination. MAMMOGRAM FINDINGS: There are scattered areas of fibroglandular density. No suspicious masses, calcifications or other abnormalities are seen in either breast. There are no significant interval changes.    IMPRESSION: There is no mammographic evidence of malignancy in either breast. Routine screening mammogram is recommended. Annual mammogram will be due in 1 year. BI-RADS Category 1: Negative RISK:  Based on the Tyrer-Cuzick (TC) risk assessment model, this patient has a 21.9%  lifetime risk of developing breast cancer, meaning they are at high risk for developing breast cancer. However, this is only an estimate based on available history provided on the patient's questionnaire. Because patients with a lifetime risk of 20% or greater may benefit from additional supplemental screening, we encourage a full breast clinical evaluation and comprehensive breast cancer risk assessment to guide further decision making. For more information regarding the management of high-risk patients, the following is a link to the [facility] care path [email or web address]://[email or web address].[email or web address]/dotNet/documents/?mazqb=20704. Additionally, a referral to the [facility] is also appropriate. Interpreting Radiologist: Edgar Wilson M.D. Electronically signed on: 02/25/2025 : JANET Transcribe Date/Time: Feb 25 2025  1:36P Dictated by: EDGAR WILSON MD This examination was interpreted and the report reviewed and electronically signed by: EDGAR WILSON MD on Feb 25 2025  3:18PM  EST    ECG 12 Lead    Result Date: 2/12/2025  Normal sinus rhythm Normal ECG When compared with ECG of 16-SEP-2008 11:02, No significant change was found Confirmed by Angel Ascencio ([id number]) on 2/12/2025 6:56:19 PM    MR knee left wo IV contrast    Result Date: 2/5/2025  Interpreted By:  Ankush Cuello and Ritchie Brandon STUDY: MRI of the left knee without contrast dated 2/5/2025.   INDICATION:  Signs/Symptoms:pain   COMPARISON: Radiographs of the left knee 01/30/2025..   ACCESSION NUMBER(S): [id number]   ORDERING CLINICIAN: ALTAGRACIA RODRIGUEZ   TECHNIQUE: Multiplanar multisequence MRI of the left knee was performed  without intravenous contrast.   FINDINGS: MUSCLES, TENDONS, AND LIGAMENTS:   The anterior cruciate ligament is intact.  The posterior cruciate ligament is intact. The medial collateral ligament is intact. The lateral collateral ligament complex is intact. The popliteus and biceps femoris tendons, iliotibial band, and extensor mechanism are intact.   MENISCI:   There is truncation of the inner free edge of the posterior horn of the medial meniscus near the root ligament junction.  The lateral meniscus is intact.   OSSEOUS STRUCTURES AND JOINTS:   No fracture or dislocation is evident.   There is mild hyaline articular cartilage of the medial and lateral femorotibial joint spaces without evidence of full thickness cartilage defect. There is thinning of the hyaline articular cartilage of the patellar apex with some fissuring on the lateral facet. There is a small volume knee joint effusion.   SOFT TISSUES:   No significant volume of fluid is evident in a popliteal cyst.       1. Free margin radial tearing of the posterior horn of the medial meniscus near the root ligament junction. 2. Mild degenerative change of the knee.   I personally reviewed the images/study and I agree with the findings as stated by resident Cristino Murry. This study was interpreted at [place], [place].   Signed by: Ankush Cuello 2/5/2025 1:40 PM Dictation workstation:   [id number]    XR knee left 4+ views    Result Date: 1/31/2025  Interpreted By:  Parihs Lind, STUDY: XR KNEE LEFT 4+ VIEWS; ;  1/30/2025 3:26 pm   INDICATION: Signs/Symptoms:pain.   ,M25.562 Pain in left knee   COMPARISON: None.   ACCESSION NUMBER(S): [id number]   ORDERING CLINICIAN: ALTAGRACIA RODRIGUEZ    FINDINGS: Left knee, four views   There is no fracture. There is no dislocation. There are no degenerative changes. There is no lytic or sclerotic lesion. There is no soft tissue abnormality seen. There is no effusion       Normal radiographs the left knee     MACRO: None   Signed by: Parish Lind 1/31/2025 7:16 PM Dictation workstation:   [id number]      Assessment:     Status Post right Hip Arthroplasty. Doing well postoperatively.     Acute postoperative pain: Reports mild to moderate pain to operative extremity.  Exacerbated by movement, relieved by rest ice and pain medication.  Continue current pain management.    Patient's status has improved, no further bleeding. She will be transferred to ICU to regular medical floor. She is no longer on wyatt gtt. Vital signs are stable. She has not required any further transfusions. Hgb this am stable at 10.0. Will continue to monitor daily hgb during hospitalization.         Plan:      1: Continues current post-op course :  2:  Continue Deep venous thrombosis prophylaxis: ASA 81 mg BID for 30 days started on 2/27/25  3:  Continue physical therapy: WBAT with anterior hip precautions with use of walker for assistance with ambulation.   4:  Continue Pain Control  5.  Discharge planning: Plans to return to home with [place], orders placed. SW and TCC following.       Kellen Muhammad, APRN-CNP

## 2025-02-27 NOTE — PROGRESS NOTES
Physical Therapy     Physical Therapy    Physical Therapy Evaluation    Patient Name: Cindy Deluna  MRN: [id number]  Today's Date: 2/27/2025   Time Calculation  Start Time: 1030  Stop Time: 1053  Time Calculation (min): 23 min  11/11-A    Assessment/Plan   PT Assessment  PT Assessment Results: Decreased strength, Decreased range of motion, Decreased endurance, Impaired balance, Decreased mobility, Pain  Rehab Prognosis: Good  Barriers to Discharge Home:  (stairs)  Evaluation/Treatment Tolerance: Patient tolerated treatment well  Medical Staff Made Aware: Yes (RN updated on patients mobility status and up in chair)  Strengths: Ability to acquire knowledge  End of Session Communication: Bedside nurse  Assessment Comment: patient would benefit from continued PT at low intensity to address functional deficits due to recent ANNA. Patient presents with decline in strength, transfers and gait  End of Session Patient Position: Up in chair (no alarm, RN aware)  IP OR SWING BED PT PLAN  Inpatient or Swing Bed: Inpatient  PT Plan  Treatment/Interventions: Bed mobility, Transfer training, Gait training, Stair training, Strengthening, Therapeutic exercise, Therapeutic activity  PT Plan: Ongoing PT  PT Frequency: BID  PT Discharge Recommendations: Low intensity level of continued care  Equipment Recommended upon Discharge:  (patient reports she has the equipment she needs at home)  PT Recommended Transfer Status: Assist x1  PT - OK to Discharge: Yes (when medically stable with continued PT)    Subjective     Current Problem:    General Visit Information:  General  Reason for Referral: right ANNA, anterior approach  Referred By: PT/OT, , 2/26, WBAT, anterior hip precautions  Past Medical History Relevant to Rehab: left medial meniscus tear, left hip OA, anxiety, hypothroid, depression  Co-Treatment: OT (to facilitate safety for patient and staff)  Prior to Session Communication: Bedside nurse  Patient Position Received:  Alarm off, not on at start of session, Bed, 3 rail up  General Comment: patient admited on 2/26 for elective right ANNA. Patient with 2L blood loss interoperatively, hypotensive in PACU and transferred to ICU due to hemorrhagic shock.    Home Living:  Home Living  Home Living Comments: Lives in 2 story home with significant other. 4 steps to enter with right handrail. Full flight of stairs to upstairs bed and bath with handrail. Patient has full bath on both levels. Walk in shower.    Prior Level of Function:  Prior Function Per Pt/Caregiver Report  Level of [place]: Independent with homemaking with ambulation  Prior Function Comments: patient is indep at home, uses quad cane, has a front wheeled walker. Has raised toilet seat with rails, bought hip kit. Shower seat, grab bars in shower on main floor.    Precautions:  Precautions  LE Weight Bearing Status: Weight Bearing as Tolerated  Precautions Comment: anterior hip precautions, falls    Vital Signs:  Vital Signs  Heart Rate: 104  Heart Rate Source: Monitor  SpO2: 94 %  Objective     Pain:  Pain Assessment  0-10 (Numeric) Pain Score: 5 - Moderate pain  Pain Type: Surgical pain  Pain Location: Hip  Pain Orientation: Right  Pain Interventions: Cold applied    Cognition:  Cognition  Overall Cognitive Status: Within Functional Limits    General Assessments:  General Observation  General Observation: patient supine in bed. Patient with IV to left forearm, purewick (removed at time of eval), and ICU telemetry. patient agreeable to PT.   Activity Tolerance  Endurance:  (fair tolerance, pain limits activity)  Sensation  Sensation Comment: denies numbness and tingling, light touch intact              Static Sitting Balance  Static Sitting-Level of Assistance: Independent  Dynamic Sitting Balance  Dynamic Sitting-Comments: patient performs dynamic sitting tasks with supervision  Static Standing Balance  Static Standing-Level of Assistance:  (stand at walker with  CGA)  Dynamic Standing Balance  Dynamic Standing-Comments:  (min assist for dynamic balance with use of walker)    Functional Assessments:     Bed Mobility  Bed Mobility:  (supine to sit with min assist for right LE, cues to scoot to edge of bed, lateral scoot to edge of bed with single leg bridge on left,  min assist for trunk. Uses bed rails)  Transfers  Transfer:  (sit to stand with mod assist from edge of bed to walker with cues for handplacement. cues to reach back for the chair for stand to sit)  Ambulation/Gait Training  Ambulation/Gait Training Performed:  (patient ambulated with wheeled walker 12' with min assist. Frequent cues for sequencing, proper walker placement, use of walker to offload right LE. Reports having burning in right LE during ambulation.)          Extremity/Trunk Assessments:  RUE   RUE : Within Functional Limits  LUE   LUE: Within Functional Limits  RLE   RLE :  (hip flexion ROM limited due to pain, (functional for sitting), hip flexion 2-/5, knee extension 3/5, hamstring 3/5, ankle 4/5)  LLE   LLE : Within Functional Limits    Outcome Measures:     [facility] Basic Mobility  Turning from your back to your side while in a flat bed without using bedrails: A little  Moving from lying on your back to sitting on the side of a flat bed without using bedrails: A little  Moving to and from bed to chair (including a wheelchair): A little  Standing up from a chair using your arms (e.g. wheelchair or bedside chair): A lot  To walk in hospital room: A little  Climbing 3-5 steps with railing: A lot  Basic Mobility - Total Score: 16                                                           Goals:  Encounter Problems       Encounter Problems (Active)       PT Problem       Patient will complete bed mobility indep without use of bed rails (Progressing)       Start:  02/27/25    Expected End:  03/13/25            Patient will complete functional transfers with wheeled walker MI (Progressing)       Start:   02/27/25    Expected End:  03/13/25            Patient will be indep with HEP for LE ROM and strengthening (Progressing)       Start:  02/27/25    Expected End:  03/13/25            Patient will ambulate 200' with wheeled walker and supervision (Progressing)       Start:  02/27/25    Expected End:  03/13/25                Patient will ascend/descend 12 stairs with on handrail and supervision (Not Progressing)       Start:  02/27/25    Expected End:  03/13/25                     Education Documentation  Handouts, taught by Linda Giles, PT at 2/27/2025 12:33 PM.  Learner: Patient  Readiness: Acceptance  Method: Explanation, Demonstration  Response: Verbalizes Understanding, Needs Reinforcement, Demonstrated Understanding  Comment: see chart    Precautions, taught by Linda Giles PT at 2/27/2025 12:33 PM.  Learner: Patient  Readiness: Acceptance  Method: Explanation, Demonstration  Response: Verbalizes Understanding, Needs Reinforcement, Demonstrated Understanding  Comment: see chart    Home Exercise Program, taught by Linda Giles PT at 2/27/2025 12:33 PM.  Learner: Patient  Readiness: Acceptance  Method: Explanation, Demonstration  Response: Verbalizes Understanding, Needs Reinforcement, Demonstrated Understanding  Comment: see chart    Mobility Training, taught by Linda Giles, PT at 2/27/2025 12:33 PM.  Learner: Patient  Readiness: Acceptance  Method: Explanation, Demonstration  Response: Verbalizes Understanding, Needs Reinforcement, Demonstrated Understanding  Comment: see chart    Education Comments    Educated in anterior hip precautions.

## 2025-02-27 NOTE — PROGRESS NOTES
"Occupational Therapy    Evaluation    Patient Name: Cindy Deluna \"Annie\"  MRN: [id number]  : 1970  Today's Date: 25  Time Calculation  Start Time: 1033  Stop Time: 1051  Time Calculation (min): 18 min     -A    Assessment:  OT Assessment: pt presents with decreased indep and safety with aDLS and functional mobility, will benefit from con't skilled OT  Prognosis: Good  Barriers to Discharge Home: No anticipated barriers  End of Session Communication: Bedside nurse  End of Session Patient Position: Up in chair (call light in reach)  OT Assessment Results: Decreased ADL status, Decreased endurance, Decreased functional mobility  Prognosis: Good    Plan:  Treatment Interventions: ADL retraining, Functional transfer training, Endurance training, Patient/family training  OT Frequency: 2 times per week  OT Discharge Recommendations: Low intensity level of continued care  OT Recommended Transfer Status: Minimal assist  OT - OK to Discharge: Yes  Treatment Interventions: ADL retraining, Functional transfer training, Endurance training, Patient/family training  Subjective     Current Problem:  1. Hemorrhagic shock (Multi)        2. Unilateral primary osteoarthritis, right hip  Referral to Home Health    acetaminophen (Tylenol) 325 mg tablet    aspirin 81 mg EC tablet    cyclobenzaprine (Flexeril) 10 mg tablet    docusate sodium (Colace) 100 mg capsule    oxyCODONE (Roxicodone) 5 mg immediate release tablet              General:   OT Received On: 25  General  Reason for Referral: R ANNA 25  Referred By: Loni PT/OT eval and tx 25  Past Medical History Relevant to Rehab: arthritis, anxiety, hypothyroidism, Hashimotos, hyster  Family/Caregiver Present: No  Co-Treatment: PT  Co-Treatment Reason: to maximize pt/staff safety  Patient Position Received: Bed, 4 rail up  General Comment: pt adm  for elective R hip surgery, had 2L estimated blood loss intraoperatively. Hypotensive in PACU, " 2UPRBCs, transferred to ICU for mgmt    Precautions:  LE Weight Bearing Status:  (R LE WBAT)  Medical Precautions: Fall precautions    Vital Signs:  SpO2: 96 % (room air)    Pain:  Pain Assessment  Pain Assessment: 0-10  0-10 (Numeric) Pain Score: 5 - Moderate pain  Pain Type: Surgical pain  Pain Location: Hip  Pain Orientation: Right  Pain Descriptors: Burning  Pain Interventions: Cold applied, Repositioned  Objective     Cognition:  Overall Cognitive Status: Within Functional Limits  Orientation Level: Oriented X4             Home Living:  Home Living Comments: lives with spouse, 2 story, bed and bath on 2nd floor, full bath also on 1st floor. Walk in shower both bathrooms with seat, has grab bar in 1st floor bathroom. has Raised toilet seat with arms, reacher and sock aid    Prior Function:  Prior Function Comments: pt reports indep with aDLS, IADLS, med mgmt, drives, denies falls. Amb with quad cane           Activities of Daily Living:   Eating Assistance: Independent  Grooming Assistance: Minimal  Bathing Assistance: Moderate  UE Dressing Assistance: Stand by  LE Dressing Assistance: Moderate  Toileting Assistance with Device: Moderate  Functional Assistance:  (pt ambulated with min A with ww 10' in room)                         Activity Tolerance:  Endurance:  (fair, limited by pain in R hip)           Bed Mobility/Transfers: Bed Mobility  Bed Mobility:  (sup to sit min A to move LE's to EOB, demo good side scooting to EOB)  Transfers  Transfer:  (sit to stand from EOB mod A, vc's to push up from seated surface)                Balance:  Static Sitting: good  Dynamic Sitting: fair  Static Standing: fair  Dynamic Standing: fair-         Vision:Vision - Basic Assessment  Current Vision: Wears glasses all the time        Sensation:  Light Touch: No apparent deficits    Strength:  Strength Comments: B UE 4/5 throughout            Extremities: RUE   RUE : Within Functional Limits and LUE   LUE: Within Functional  Limits    Outcome Measures: [facility] Daily Activity  Putting on and taking off regular lower body clothing: A lot  Bathing (including washing, rinsing, drying): A little  Putting on and taking off regular upper body clothing: None  Toileting, which includes using toilet, bedpan or urinal: A little  Taking care of personal grooming such as brushing teeth: A little  Eating Meals: None  Daily Activity - Total Score: 19    Education Documentation  Precautions, taught by Lani Duran OT at 2/27/2025 12:50 PM.  Learner: Patient  Readiness: Acceptance  Method: Explanation  Response: Verbalizes Understanding, Needs Reinforcement    ADL Training, taught by Lani Duran OT at 2/27/2025 12:50 PM.  Learner: Patient  Readiness: Acceptance  Method: Explanation  Response: Verbalizes Understanding, Needs Reinforcement          EDUCATION:  Education  Education Comment: OT educated pt on anterior hip precautions    Goals:  Encounter Problems       Encounter Problems (Active)       OT Goals       pt will dress LB with mod indep (Progressing)       Start:  02/27/25    Expected End:  03/13/25            pt will verbalize and adhere to anterior hip precautions (Progressing)       Start:  02/27/25    Expected End:  03/13/25            Pt will transfer to bed, chair, toilet with CGA (Progressing)       Start:  02/27/25    Expected End:  03/13/25            Pt will bathe/dress UB indep (Progressing)       Start:  02/27/25    Expected End:  03/13/25

## 2025-02-27 NOTE — CARE PLAN
Problem: PT Problem  Goal: Patient will ascend/descend 12 stairs with on handrail and supervision  Outcome: Not Progressing

## 2025-02-28 ENCOUNTER — DOCUMENTATION (OUTPATIENT)
Dept: HOME HEALTH SERVICES | Facility: HOME HEALTH | Age: 55
End: 2025-02-28
Payer: COMMERCIAL

## 2025-02-28 ENCOUNTER — APPOINTMENT (OUTPATIENT)
Dept: RADIOLOGY | Facility: HOSPITAL | Age: 55
DRG: 908 | End: 2025-02-28
Payer: COMMERCIAL

## 2025-02-28 ENCOUNTER — PHARMACY VISIT (OUTPATIENT)
Dept: PHARMACY | Facility: CLINIC | Age: 55
End: 2025-02-28
Payer: COMMERCIAL

## 2025-02-28 LAB
ANION GAP SERPL CALC-SCNC: 12 MMOL/L (ref 10–20)
BASOPHILS # BLD AUTO: 0.02 X10*3/UL (ref 0–0.1)
BASOPHILS NFR BLD AUTO: 0.2 %
BLOOD EXPIRATION DATE: NORMAL
BLOOD EXPIRATION DATE: NORMAL
BUN SERPL-MCNC: 17 MG/DL (ref 6–23)
CALCIUM SERPL-MCNC: 8.2 MG/DL (ref 8.6–10.3)
CHLORIDE SERPL-SCNC: 103 MMOL/L (ref 98–107)
CO2 SERPL-SCNC: 27 MMOL/L (ref 21–32)
CREAT SERPL-MCNC: 0.53 MG/DL (ref 0.5–1.05)
DISPENSE STATUS: NORMAL
DISPENSE STATUS: NORMAL
EGFRCR SERPLBLD CKD-EPI 2021: >90 ML/MIN/1.73M*2
EOSINOPHIL # BLD AUTO: 0 X10*3/UL (ref 0–0.7)
EOSINOPHIL NFR BLD AUTO: 0 %
ERYTHROCYTE [DISTWIDTH] IN BLOOD BY AUTOMATED COUNT: 13.4 % (ref 11.5–14.5)
ERYTHROCYTE [DISTWIDTH] IN BLOOD BY AUTOMATED COUNT: 13.8 % (ref 11.5–14.5)
GLUCOSE SERPL-MCNC: 131 MG/DL (ref 74–99)
HCT VFR BLD AUTO: 26 % (ref 36–46)
HCT VFR BLD AUTO: 28.5 % (ref 36–46)
HGB BLD-MCNC: 8.9 G/DL (ref 12–16)
HGB BLD-MCNC: 9.5 G/DL (ref 12–16)
IMM GRANULOCYTES # BLD AUTO: 0.04 X10*3/UL (ref 0–0.7)
IMM GRANULOCYTES NFR BLD AUTO: 0.4 % (ref 0–0.9)
LYMPHOCYTES # BLD AUTO: 1.37 X10*3/UL (ref 1.2–4.8)
LYMPHOCYTES NFR BLD AUTO: 13.6 %
MAGNESIUM SERPL-MCNC: 2.06 MG/DL (ref 1.6–2.4)
MCH RBC QN AUTO: 30.4 PG (ref 26–34)
MCH RBC QN AUTO: 30.9 PG (ref 26–34)
MCHC RBC AUTO-ENTMCNC: 33.3 G/DL (ref 32–36)
MCHC RBC AUTO-ENTMCNC: 34.2 G/DL (ref 32–36)
MCV RBC AUTO: 90 FL (ref 80–100)
MCV RBC AUTO: 91 FL (ref 80–100)
MONOCYTES # BLD AUTO: 0.67 X10*3/UL (ref 0.1–1)
MONOCYTES NFR BLD AUTO: 6.6 %
NEUTROPHILS # BLD AUTO: 8 X10*3/UL (ref 1.2–7.7)
NEUTROPHILS NFR BLD AUTO: 79.2 %
NRBC BLD-RTO: 0 /100 WBCS (ref 0–0)
NRBC BLD-RTO: 0 /100 WBCS (ref 0–0)
PHOSPHATE SERPL-MCNC: 2.1 MG/DL (ref 2.5–4.9)
PLATELET # BLD AUTO: 93 X10*3/UL (ref 150–450)
PLATELET # BLD AUTO: 99 X10*3/UL (ref 150–450)
POTASSIUM SERPL-SCNC: 3.7 MMOL/L (ref 3.5–5.3)
PRODUCT BLOOD TYPE: 8400
PRODUCT BLOOD TYPE: 8400
PRODUCT CODE: NORMAL
PRODUCT CODE: NORMAL
RBC # BLD AUTO: 2.88 X10*6/UL (ref 4–5.2)
RBC # BLD AUTO: 3.12 X10*6/UL (ref 4–5.2)
SODIUM SERPL-SCNC: 138 MMOL/L (ref 136–145)
UFH PPP CHRO-ACNC: 0.5 IU/ML
UFH PPP CHRO-ACNC: 0.6 IU/ML
UFH PPP CHRO-ACNC: 1 IU/ML
UNIT ABO: NORMAL
UNIT ABO: NORMAL
UNIT NUMBER: NORMAL
UNIT NUMBER: NORMAL
UNIT RH: NORMAL
UNIT RH: NORMAL
UNIT VOLUME: 209
UNIT VOLUME: 212
WBC # BLD AUTO: 10.1 X10*3/UL (ref 4.4–11.3)
WBC # BLD AUTO: 11.5 X10*3/UL (ref 4.4–11.3)

## 2025-02-28 PROCEDURE — 2500000001 HC RX 250 WO HCPCS SELF ADMINISTERED DRUGS (ALT 637 FOR MEDICARE OP): Performed by: NURSE PRACTITIONER

## 2025-02-28 PROCEDURE — 82374 ASSAY BLOOD CARBON DIOXIDE: CPT | Performed by: NURSE PRACTITIONER

## 2025-02-28 PROCEDURE — 36415 COLL VENOUS BLD VENIPUNCTURE: CPT

## 2025-02-28 PROCEDURE — 36415 COLL VENOUS BLD VENIPUNCTURE: CPT | Performed by: NURSE PRACTITIONER

## 2025-02-28 PROCEDURE — 85520 HEPARIN ASSAY: CPT

## 2025-02-28 PROCEDURE — 2500000001 HC RX 250 WO HCPCS SELF ADMINISTERED DRUGS (ALT 637 FOR MEDICARE OP)

## 2025-02-28 PROCEDURE — 2500000002 HC RX 250 W HCPCS SELF ADMINISTERED DRUGS (ALT 637 FOR MEDICARE OP, ALT 636 FOR OP/ED): Performed by: NURSE PRACTITIONER

## 2025-02-28 PROCEDURE — 1200000002 HC GENERAL ROOM WITH TELEMETRY DAILY

## 2025-02-28 PROCEDURE — 2550000001 HC RX 255 CONTRASTS

## 2025-02-28 PROCEDURE — 84100 ASSAY OF PHOSPHORUS: CPT | Performed by: NURSE PRACTITIONER

## 2025-02-28 PROCEDURE — 71275 CT ANGIOGRAPHY CHEST: CPT | Performed by: RADIOLOGY

## 2025-02-28 PROCEDURE — 73706 CT ANGIO LWR EXTR W/O&W/DYE: CPT | Mod: RT

## 2025-02-28 PROCEDURE — 99231 SBSQ HOSP IP/OBS SF/LOW 25: CPT

## 2025-02-28 PROCEDURE — 2500000004 HC RX 250 GENERAL PHARMACY W/ HCPCS (ALT 636 FOR OP/ED)

## 2025-02-28 PROCEDURE — 97530 THERAPEUTIC ACTIVITIES: CPT | Mod: GP,CQ

## 2025-02-28 PROCEDURE — 71275 CT ANGIOGRAPHY CHEST: CPT

## 2025-02-28 PROCEDURE — 85025 COMPLETE CBC W/AUTO DIFF WBC: CPT | Performed by: NURSE PRACTITIONER

## 2025-02-28 PROCEDURE — 97116 GAIT TRAINING THERAPY: CPT | Mod: GP,CQ

## 2025-02-28 PROCEDURE — 97535 SELF CARE MNGMENT TRAINING: CPT | Mod: GO,CO

## 2025-02-28 PROCEDURE — 83735 ASSAY OF MAGNESIUM: CPT | Performed by: NURSE PRACTITIONER

## 2025-02-28 PROCEDURE — 2550000001 HC RX 255 CONTRASTS: Performed by: ORTHOPAEDIC SURGERY

## 2025-02-28 PROCEDURE — 85027 COMPLETE CBC AUTOMATED: CPT

## 2025-02-28 PROCEDURE — 85520 HEPARIN ASSAY: CPT | Performed by: ORTHOPAEDIC SURGERY

## 2025-02-28 RX ORDER — OXYCODONE HYDROCHLORIDE 5 MG/1
10 TABLET ORAL EVERY 4 HOURS PRN
Status: DISCONTINUED | OUTPATIENT
Start: 2025-02-28 | End: 2025-03-02 | Stop reason: HOSPADM

## 2025-02-28 RX ORDER — ALUMINUM HYDROXIDE, MAGNESIUM HYDROXIDE, AND SIMETHICONE 1200; 120; 1200 MG/30ML; MG/30ML; MG/30ML
20 SUSPENSION ORAL 4 TIMES DAILY PRN
Status: DISCONTINUED | OUTPATIENT
Start: 2025-02-28 | End: 2025-03-02 | Stop reason: HOSPADM

## 2025-02-28 RX ORDER — HEPARIN SODIUM 10000 [USP'U]/100ML
0-4500 INJECTION, SOLUTION INTRAVENOUS CONTINUOUS
Status: DISPENSED | OUTPATIENT
Start: 2025-02-28 | End: 2025-03-02

## 2025-02-28 RX ADMIN — IOHEXOL 100 ML: 350 INJECTION, SOLUTION INTRAVENOUS at 17:25

## 2025-02-28 RX ADMIN — DOCUSATE SODIUM 100 MG: 100 CAPSULE, LIQUID FILLED ORAL at 21:03

## 2025-02-28 RX ADMIN — ALUMINUM HYDROXIDE, MAGNESIUM HYDROXIDE, AND DIMETHICONE 20 ML: 200; 20; 200 SUSPENSION ORAL at 15:37

## 2025-02-28 RX ADMIN — ASPIRIN 81 MG: 81 TABLET, COATED ORAL at 08:33

## 2025-02-28 RX ADMIN — ACETAMINOPHEN 650 MG: 325 TABLET ORAL at 06:40

## 2025-02-28 RX ADMIN — ACETAMINOPHEN 650 MG: 325 TABLET ORAL at 13:04

## 2025-02-28 RX ADMIN — ESCITALOPRAM 7.5 MG: 5 TABLET, FILM COATED ORAL at 21:03

## 2025-02-28 RX ADMIN — HEPARIN SODIUM 17 UNITS/HR: 10000 INJECTION, SOLUTION INTRAVENOUS at 13:59

## 2025-02-28 RX ADMIN — DOCUSATE SODIUM 100 MG: 100 CAPSULE, LIQUID FILLED ORAL at 08:33

## 2025-02-28 RX ADMIN — OXYCODONE 10 MG: 5 TABLET ORAL at 04:25

## 2025-02-28 RX ADMIN — IOHEXOL 75 ML: 350 INJECTION, SOLUTION INTRAVENOUS at 09:49

## 2025-02-28 RX ADMIN — ACETAMINOPHEN 650 MG: 325 TABLET ORAL at 18:31

## 2025-02-28 RX ADMIN — OXYCODONE 10 MG: 5 TABLET ORAL at 15:39

## 2025-02-28 RX ADMIN — LEVOTHYROXINE SODIUM 112 MCG: 112 TABLET ORAL at 06:40

## 2025-02-28 RX ADMIN — OXYCODONE 10 MG: 5 TABLET ORAL at 21:03

## 2025-02-28 RX ADMIN — OXYCODONE 10 MG: 5 TABLET ORAL at 08:34

## 2025-02-28 ASSESSMENT — COGNITIVE AND FUNCTIONAL STATUS - GENERAL
CLIMB 3 TO 5 STEPS WITH RAILING: A LITTLE
CLIMB 3 TO 5 STEPS WITH RAILING: A LOT
TOILETING: A LITTLE
TURNING FROM BACK TO SIDE WHILE IN FLAT BAD: A LITTLE
TURNING FROM BACK TO SIDE WHILE IN FLAT BAD: A LITTLE
TOILETING: A LITTLE
WALKING IN HOSPITAL ROOM: A LITTLE
MOBILITY SCORE: 18
MOVING FROM LYING ON BACK TO SITTING ON SIDE OF FLAT BED WITH BEDRAILS: A LITTLE
WALKING IN HOSPITAL ROOM: A LITTLE
DRESSING REGULAR LOWER BODY CLOTHING: A LITTLE
STANDING UP FROM CHAIR USING ARMS: A LITTLE
MOBILITY SCORE: 18
MOVING TO AND FROM BED TO CHAIR: A LITTLE
STANDING UP FROM CHAIR USING ARMS: A LITTLE
DRESSING REGULAR LOWER BODY CLOTHING: A LITTLE
DAILY ACTIVITIY SCORE: 21
MOVING TO AND FROM BED TO CHAIR: A LITTLE
HELP NEEDED FOR BATHING: A LITTLE
DAILY ACTIVITIY SCORE: 22

## 2025-02-28 ASSESSMENT — PAIN SCALES - GENERAL
PAINLEVEL_OUTOF10: 8
PAINLEVEL_OUTOF10: 4
PAINLEVEL_OUTOF10: 7
PAINLEVEL_OUTOF10: 7
PAINLEVEL_OUTOF10: 0 - NO PAIN
PAINLEVEL_OUTOF10: 7

## 2025-02-28 ASSESSMENT — PAIN - FUNCTIONAL ASSESSMENT
PAIN_FUNCTIONAL_ASSESSMENT: 0-10
PAIN_FUNCTIONAL_ASSESSMENT: 0-10
PAIN_FUNCTIONAL_ASSESSMENT: WONG-BAKER FACES
PAIN_FUNCTIONAL_ASSESSMENT: 0-10
PAIN_FUNCTIONAL_ASSESSMENT: 0-10
PAIN_FUNCTIONAL_ASSESSMENT: WONG-BAKER FACES

## 2025-02-28 ASSESSMENT — PAIN DESCRIPTION - ORIENTATION: ORIENTATION: RIGHT

## 2025-02-28 ASSESSMENT — PAIN SCALES - WONG BAKER
WONGBAKER_NUMERICALRESPONSE: HURTS LITTLE BIT
WONGBAKER_NUMERICALRESPONSE: HURTS LITTLE BIT

## 2025-02-28 ASSESSMENT — ACTIVITIES OF DAILY LIVING (ADL): HOME_MANAGEMENT_TIME_ENTRY: 30

## 2025-02-28 ASSESSMENT — PAIN DESCRIPTION - LOCATION
LOCATION: HIP
LOCATION: HIP

## 2025-02-28 NOTE — PROGRESS NOTES
Physical Therapy                 Therapy Communication Note    Patient Name: Cindy Deluna  MRN: [id number]  Department: [facility]  Room: [address] [place], [place] [address]  Today's Date: 2/28/2025     Discipline: Physical Therapy    PT Missed Visit: Yes     Missed Visit Reason: Missed Visit Reason: Patient placed on medical hold (hold per ortho CNP . Pt has PE, Will be starting on Heparin drip shortly and will be going for more testing to rule out DVT)    Missed Time: Attempt 12:58    Comment:

## 2025-02-28 NOTE — PROGRESS NOTES
02/28/25 0959   Discharge Planning   Home or Post Acute Services In home services   Type of Home Care Services Home OT;Home PT   Expected Discharge Disposition Home H  ([facility])     Pt is s/p POD #2 Elective Right total hip arthroplasty, anterior approach with Dr. Rey Ivey. Pt is weight bearing as tolerated with anterior hip precautions with use of walker for assistance with ambulation. Pt will discharge on Aspirin 81 mg PO BID x 30 days started on 2/27/25 for DVT prophylaxis. Pt will discharge with Spica ACE that will remain on until POD #3.  AMPAC scores are PT (16) OT (19) recommending continued therapy at low level/intensity. Pt discharge preference remains home with [facility]. Demographics verified, best phone # is spouse [phone] or pt [phone]. [facility]  notified of [facility] referral/HCO in Epic, confirms received and processed for next day SOC 3/1/25.    Update 2/28/25 11:54 AM: notified by Ortho CNP that pt discharge is held, pt positive for PE. [facility] intake notified of this information, will need updated on day of DC.

## 2025-02-28 NOTE — HH CARE COORDINATION
Home Care received a Referral for Physical Therapy and Occupational Therapy. We have processed the referral for a Start of Care on 3/1/25.     If you have any questions or concerns, please feel free to contact us at [phone]. Follow the prompts, enter your five digit zip code, and you will be directed to your care team on [facility] 1.

## 2025-02-28 NOTE — CARE PLAN
The patient's goals for the shift include rest and comfort     The clinical goals for the shift include pain management    Over the shift, the patient did make progress toward the following goals. Barriers to progression include none. Recommendations to address these barriers include none.

## 2025-02-28 NOTE — PROGRESS NOTES
Physical Therapy    Physical Therapy Treatment    Patient Name: Cindy Deluna  MRN: [id number]  Today's Date: 2/28/2025  Time Calculation  Start Time: 0843  Stop Time: 0921  Time Calculation (min): 38 min     605/605-A    Assessment/Plan   PT Assessment  PT Assessment Results: Decreased strength, Decreased range of motion, Decreased endurance, Impaired balance, Decreased mobility, Pain  Rehab Prognosis: Good  Barriers to Discharge Home:  (stairs)  Evaluation/Treatment Tolerance: Patient tolerated treatment well  Medical Staff Made Aware: Yes  Strengths: Ability to acquire knowledge  End of Session Communication: Bedside nurse (CNP and MD)  Assessment Comment: pt participating well and progressing towards goals  End of Session Patient Position: Up in chair, Alarm on     Treatment/Interventions: Bed mobility, Transfer training, Gait training, Stair training, Strengthening, Therapeutic exercise, Therapeutic activity  PT Plan: Ongoing PT  PT Frequency: BID  PT Discharge Recommendations: Low intensity level of continued care  Equipment Recommended upon Discharge:  (patient reports she has the equipment she needs at home)   PT Recommended Transfer Status: Assist x1    General Visit Information:   PT  Visit  PT Received On: 02/28/25  General  Reason for Referral: R THR  PT Missed Visit: Yes  Missed Visit Reason: Patient placed on medical hold (hold per ortho CNP . Pt has PE, Will be starting on Heparin drip shortly and will be going for more testing to rule out DVT)  Family/Caregiver Present: No  Prior to Session Communication: Bedside nurse (cleared to participate)  Patient Position Received: Up in chair, Alarm on  General Comment: agreeable to participate , states that she plans on going home , that her spouse has taken off a few weeks, denies any home going concerns        Subjective     Precautions:  Precautions  LE Weight Bearing Status: Weight Bearing as Tolerated  Medical Precautions: Fall  precautions  Post-Surgical Precautions: Right hip precautions (anterior  hip precautions  R LE)  Precautions Comment: pt able to maintain hip precauions throughout treatment    Vital Signs:  Vital Signs  Heart Rate: (!) 111 (increasing to 135 with gait , recovers with seated rest break)  SpO2: 94 % (decreasing to 91 to 92 with gait , recovering quickly with seated rest and pursed lip breathing)  Vital Signs Comment: pt c/o SOB with activity , states that she was very active at home prior to surgery and was not SOB, Nursing notified  Objective     Pain:  Pain Assessment  Pain Assessment: 0-10  0-10 (Numeric) Pain Score: 4 (pt reports decreased tightness after gait)  Pain Type: Acute pain, Surgical pain  Pain Location: Hip  Pain Orientation: Right  Pain Descriptors: Aching, Burning  Pain Interventions: Cold pack    Cognition:  Cognition  Overall Cognitive Status: Within Functional Limits      Activity Tolerance:  Activity Tolerance  Endurance:  (pt c/o increased SOB with activity)    Treatments:  Therapeutic Exercise  Therapeutic Exercise Performed: Yes  Therapeutic Exercise Activity 1: reviewed and performed minimal reps of ther  ther ex including ankle pumps, quad sets, glut sets, heel slides, SAQ   Written instructions issued for HEP  Therapeutic Exercise Activity 2: Pt performed seated ther ex  including LAQ, and Heel/toe raises 10-20 reps. Issued HEP for discharge. Verbal and tactile cues for technique and breathing.        Ambulation/Gait Training  Ambulation/Gait Training Performed: Yes (ambulating 110ft x 2 with WW and SBA  using slow step through gait pattern)  Transfers  Transfer: Yes (transfers sit <-> stand with WW and CGA with good safety awareness and hand placement)  Stairs  Stairs: Yes (up and down 5 stairs 2 times with quad cane and SBA with good sequencing)     Outcome Measures:     [facility] Basic Mobility  Turning from your back to your side while in a flat bed without using bedrails: A little  Moving  from lying on your back to sitting on the side of a flat bed without using bedrails: A little  Moving to and from bed to chair (including a wheelchair): A little  Standing up from a chair using your arms (e.g. wheelchair or bedside chair): A little  To walk in hospital room: A little  Climbing 3-5 steps with railing: A little  Basic Mobility - Total Score: 18            EDUCATION:    Individual(s) Educated: Patient  Education Provided: Body Mechanics, Home Exercise Program, Fall Risk, Post-Op Precautions  Patient Response to Education: Patient/Caregiver Verbalized Understanding of Information, Patient/Caregiver Performed Return Demonstration of Exercises/Activities, Patient/Caregiver Asked Appropriate Questions    Encounter Problems       Encounter Problems (Active)       PT Problem       Patient will complete bed mobility indep without use of bed rails (Progressing)       Start:  02/27/25    Expected End:  03/13/25            Patient will complete functional transfers with wheeled walker MI (Progressing)       Start:  02/27/25    Expected End:  03/13/25            Patient will be indep with HEP for LE ROM and strengthening (Progressing)       Start:  02/27/25    Expected End:  03/13/25            Patient will ambulate 200' with wheeled walker and supervision (Progressing)       Start:  02/27/25    Expected End:  03/13/25                Patient will ascend/descend 12 stairs with on handrail and supervision (Progressing)       Start:  02/27/25    Expected End:  03/13/25                   Pain - Adult               Patient/Caregiver provided printed discharge information.

## 2025-02-28 NOTE — PROGRESS NOTES
Hip surgery    Progress Note    Subjective:     Post-Operative Day # 2   Status Post right Hip Arthroplasty    Systemic or Specific Complaints: No Complaints    Objective:     Visit Vitals  /65   Pulse 105   Temp 36.5 °C (97.7 °F)   Resp 18       General: alert and oriented, in no acute distress, appears stated age, and cooperative   Wound: no erythema, no edema, no drainage, and ace spica dressing and mepilex remain clean, dry and intact   Motion: Painful range of Motion   DVT Exam: No evidence of DVT seen on physical exam.  Negative Pilar's sign.  No significant calf/ankle edema.       NVI in lower extremity. left thigh soft to palpation. Strong equal dorsi/plantar flexion bilaterally. Good distal pulses bilaterally.      Data Review  Recent Results (from the past 24 hours)   CBC and Auto Differential    Collection Time: 02/28/25  4:31 AM   Result Value Ref Range    WBC 10.1 4.4 - 11.3 x10*3/uL    nRBC 0.0 0.0 - 0.0 /100 WBCs    RBC 3.12 (L) 4.00 - 5.20 x10*6/uL    Hemoglobin 9.5 (L) 12.0 - 16.0 g/dL    Hematocrit 28.5 (L) 36.0 - 46.0 %    MCV 91 80 - 100 fL    MCH 30.4 26.0 - 34.0 pg    MCHC 33.3 32.0 - 36.0 g/dL    RDW 13.8 11.5 - 14.5 %    Platelets 93 (L) 150 - 450 x10*3/uL    Neutrophils % 79.2 40.0 - 80.0 %    Immature Granulocytes %, Automated 0.4 0.0 - 0.9 %    Lymphocytes % 13.6 13.0 - 44.0 %    Monocytes % 6.6 2.0 - 10.0 %    Eosinophils % 0.0 0.0 - 6.0 %    Basophils % 0.2 0.0 - 2.0 %    Neutrophils Absolute 8.00 (H) 1.20 - 7.70 x10*3/uL    Immature Granulocytes Absolute, Automated 0.04 0.00 - 0.70 x10*3/uL    Lymphocytes Absolute 1.37 1.20 - 4.80 x10*3/uL    Monocytes Absolute 0.67 0.10 - 1.00 x10*3/uL    Eosinophils Absolute 0.00 0.00 - 0.70 x10*3/uL    Basophils Absolute 0.02 0.00 - 0.10 x10*3/uL   Basic Metabolic Panel    Collection Time: 02/28/25  4:31 AM   Result Value Ref Range    Glucose 131 (H) 74 - 99 mg/dL    Sodium 138 136 - 145 mmol/L    Potassium 3.7 3.5 - 5.3 mmol/L    Chloride  103 98 - 107 mmol/L    Bicarbonate 27 21 - 32 mmol/L    Anion Gap 12 10 - 20 mmol/L    Urea Nitrogen 17 6 - 23 mg/dL    Creatinine 0.53 0.50 - 1.05 mg/dL    eGFR >90 >60 mL/min/1.73m*2    Calcium 8.2 (L) 8.6 - 10.3 mg/dL   Magnesium    Collection Time: 02/28/25  4:31 AM   Result Value Ref Range    Magnesium 2.06 1.60 - 2.40 mg/dL   Phosphorus    Collection Time: 02/28/25  4:31 AM   Result Value Ref Range    Phosphorus 2.1 (L) 2.5 - 4.9 mg/dL     CT angio chest for pulmonary embolism    Result Date: 2/28/2025  Interpreted By:  Matthew Tan, STUDY: CT ANGIO CHEST FOR PULMONARY EMBOLISM;  2/28/2025 10:49 am   INDICATION: 53 y/o   F with  Signs/Symptoms:SOB with excertion..   COMPARISON: None available.   ACCESSION NUMBER(S): [id number]   ORDERING CLINICIAN: AFUA DELGADO   TECHNIQUE: CT images of the chest obtained following the administration of  75 ml of IV contrast (Omnipaque 350) in the pulmonary arterial phase of contrast. Axial and coronal MIP images, as well as coronal sagittal reformatted images generated and reviewed.   FINDINGS: LIMITATIONS/LINES:   None.   PULMONARY ARTERIES:   Filling defect in segmental branch with extension to subsegmental branches in lateral segment of right middle lobe. Filling defect also observed in subsequent branch to medial basal segment of right lower lobe. No additional filling defects identified   HEART:   Heart is within normal limits of size. No significant pericardial effusion.   MEDIASTINUM/JESSY:   Unremarkable.   PLEURA:   Unremarkable.   LUNG PARENCHYMA:   Unremarkable.   BONES:   Unremarkable.   CHEST WALL/OTHER:   Unremarkable.       Pulmonary emboli segmental branch right middle lobe and subsegmental branch right lower lobe.   MACRO: Matthew Tan discussed the significance and urgency of this critical finding by telephone with  AFUA EDLGADO on 2/28/2025 at 11:13 am. (**-RCF-**) Findings:  Acute pulmonary embolus.   None   Signed by: Matthew Tan 2/28/2025 11:16  AM Dictation workstation:   [id number]    XR pelvis 1-2 views    Result Date: 2/26/2025  Interpreted By:  Jay Jay Phan, STUDY: XR PELVIS 1-2 VIEWS;  2/26/2025 10:30 am   INDICATION: Signs/Symptoms:Post op hip.     COMPARISON: Pre-surgical Right hip and/or pelvis series, 26 November 2024   ACCESSION NUMBER(S): [id number]   ORDERING CLINICIAN: DIDI HORTON   TECHNIQUE: Frontal view of the right hip obtained portably in the immediate or near-immediate postoperative setting   FINDINGS: No acute unexpected radiographic findings shortly after right hip replacement; refer to the operative report       As above     MACRO: None   Signed by: Jay Jay Phan 2/26/2025 1:56 PM Dictation workstation:   [id number]    FL fluoro images no charge    Result Date: 2/26/2025  These images are not reportable by radiology and will not be interpreted by  Radiologists.    BI mammo bilateral screening tomosynthesis    Result Date: 2/25/2025  * * *Final Report* * * DATE OF EXAM: Feb 25 2025  1:51PM   [facility]   [id number]  -  ARTEMIO SCREENING W FREDI  / ACCESSION #  [id number] PROCEDURE REASON: Encounter for screening mammogram for breast cancer      * * * * Physician Interpretation * * * * RESULT: [place] [address] Phone: [phone] #[phone] - ARTEMIO SCREENING W FREDI HISTORY: 54 year-old patient seen for screening. No current complaints. Patient states no personal history of breast cancer. COMPARISON STUDIES: The present examination has been compared to prior imaging studies dated 02/28/2019 (mammogram), 07/10/2020 (mammogram), 04/20/2022 (mammogram), 08/22/2023 (mammogram) and 09/30/2023 (mammogram). MAMMOGRAM TECHNIQUE: The study was acquired using full field digital technology and interpreted from soft copy. Digital Breast Tomosynthesis (DBT) images were obtained and used to assist in the interpretation of this examination. MAMMOGRAM FINDINGS: There are scattered areas of  fibroglandular density. No suspicious masses, calcifications or other abnormalities are seen in either breast. There are no significant interval changes.    IMPRESSION: There is no mammographic evidence of malignancy in either breast. Routine screening mammogram is recommended. Annual mammogram will be due in 1 year. BI-RADS Category 1: Negative RISK:  Based on the Tyrer-Cuzick (TC) risk assessment model, this patient has a 21.9%  lifetime risk of developing breast cancer, meaning they are at high risk for developing breast cancer. However, this is only an estimate based on available history provided on the patient's questionnaire. Because patients with a lifetime risk of 20% or greater may benefit from additional supplemental screening, we encourage a full breast clinical evaluation and comprehensive breast cancer risk assessment to guide further decision making. For more information regarding the management of high-risk patients, the following is a link to the [facility] care path https://[email or web address]/dotNet/documents/?ouhaa=52298. Additionally, a referral to the [facility] is also appropriate. Interpreting Radiologist: Edgar Wilson M.D. Electronically signed on: 02/25/2025 : JANET Transcribe Date/Time: Feb 25 2025  1:36P Dictated by: EDGAR WILSON MD This examination was interpreted and the report reviewed and electronically signed by: EDGAR WILSON MD on Feb 25 2025  3:18PM  EST    ECG 12 Lead    Result Date: 2/12/2025  Normal sinus rhythm Normal ECG When compared with ECG of 16-SEP-2008 11:02, No significant change was found Confirmed by Angel Ascencio ([id number]) on 2/12/2025 6:56:19 PM    MR knee left wo IV contrast    Result Date: 2/5/2025  Interpreted By:  Ankush Cuello and Ritchie Brandon STUDY: MRI of the left knee without contrast dated 2/5/2025.   INDICATION: Signs/Symptoms:pain   COMPARISON: Radiographs of the left knee 01/30/2025..   ACCESSION  NUMBER(S): [id number]   ORDERING CLINICIAN: ALTAGRACIA RODRIGUEZ   TECHNIQUE: Multiplanar multisequence MRI of the left knee was performed  without intravenous contrast.   FINDINGS: MUSCLES, TENDONS, AND LIGAMENTS:   The anterior cruciate ligament is intact.  The posterior cruciate ligament is intact. The medial collateral ligament is intact. The lateral collateral ligament complex is intact. The popliteus and biceps femoris tendons, iliotibial band, and extensor mechanism are intact.   MENISCI:   There is truncation of the inner free edge of the posterior horn of the medial meniscus near the root ligament junction.  The lateral meniscus is intact.   OSSEOUS STRUCTURES AND JOINTS:   No fracture or dislocation is evident.   There is mild hyaline articular cartilage of the medial and lateral femorotibial joint spaces without evidence of full thickness cartilage defect. There is thinning of the hyaline articular cartilage of the patellar apex with some fissuring on the lateral facet. There is a small volume knee joint effusion.   SOFT TISSUES:   No significant volume of fluid is evident in a popliteal cyst.       1. Free margin radial tearing of the posterior horn of the medial meniscus near the root ligament junction. 2. Mild degenerative change of the knee.   I personally reviewed the images/study and I agree with the findings as stated by resident Cristino Murry. This study was interpreted at [place], [place].   Signed by: Ankush Cuello 2/5/2025 1:40 PM Dictation workstation:   [id number]    XR knee left 4+ views    Result Date: 1/31/2025  Interpreted By:  Parish Lind, STUDY: XR KNEE LEFT 4+ VIEWS; ;  1/30/2025 3:26 pm   INDICATION: Signs/Symptoms:pain.   ,M25.562 Pain in left knee   COMPARISON: None.   ACCESSION NUMBER(S): [id number]   ORDERING CLINICIAN: ALTAGRACIA RODRIGUEZ   FINDINGS: Left knee, four views   There is no fracture. There is no dislocation. There  are no degenerative changes. There is no lytic or sclerotic lesion. There is no soft tissue abnormality seen. There is no effusion       Normal radiographs the left knee     MACRO: None   Signed by: Parish Lind 1/31/2025 7:16 PM Dictation workstation:   [id number]      Assessment:     Status Post left Hip Arthroplasty. Doing well postoperatively. No acute events overnight.     Acute postoperative pain: Reports mild to moderate pain to operative extremity. Exacerbated by movement, relieved by rest ice and pain medication. Continue current pain management.     2/27: Patient transferred to regular medical floor. No longer requiring wyatt gtt. Vitals stable. Hgb stable at 10.0.     2/28: Patient tachycardic overnight and this AM. While working with PT this morning she reported feeling SOB and hot. CTA chest ordered to r/o PE.     Addendum 1130:  CTA  positive for Pulmonary emboli segmental branch right middle lobe and subsegmental branch right lower lobe.  Hep gtt initiated. Will obtain CTA right hip/thigh as well.   Discharge will be held at this time.     Plan:      1.  Continues current post-op course   2.  Continue Deep venous thrombosis prophylaxis: Aspirin 81 mg BID for 30 days  3.  Continue physical therapy: WBAT with anterior hip precautions to operative extremity  4.  Continue Pain Control: scripts sent  5.  Discharge planning: patient plans to return to home with  home care at discharge, orders placed. ALLIE and SHANE following for discharge planning.       SORAYA Roberson-CNP   2/28/2025 1:26 PM

## 2025-02-28 NOTE — NURSING NOTE
RRN nursing note 12 hour follow up post icu transfer . Tachycardic-otherwise vss, RA, weight bearing as tolerated with hip precautions- per notes. Will continue to follow.

## 2025-02-28 NOTE — PROGRESS NOTES
Occupational Therapy    OT Treatment    Patient Name: Cindy Deluna  MRN: [id number]  Department: [facility]  Room: [address] [place] [address]  Today's Date: 2/28/2025  Time Calculation  Start Time: 1055  Stop Time: 1125  Time Calculation (min): 30 min        Assessment:  End of Session Communication: Bedside nurse  End of Session Patient Position: Up in chair, Alarm on (Nursing and tech in room)     Plan:  Treatment Interventions: ADL retraining, Functional transfer training, Endurance training, Patient/family training  OT Frequency: 2 times per week  OT Discharge Recommendations: Low intensity level of continued care  OT Recommended Transfer Status: Minimal assist  OT - OK to Discharge: Yes  Treatment Interventions: ADL retraining, Functional transfer training, Endurance training, Patient/family training    Subjective   Previous Visit Info:  OT Last Visit  OT Received On: 02/28/25  General:  General  Prior to Session Communication: Bedside nurse  Patient Position Received: Up in chair, Alarm on  General Comment:  (pt agreeable to therapy, pt states feeling fatigue this date, but feels motivated to participate.)  Precautions:  LE Weight Bearing Status: Weight Bearing as Tolerated  Post-Surgical Precautions: Right hip precautions  Precautions Comment:  (pt verbalized 3 hip precaution. provided hip precaution handout)    Vital Signs Comment:  (pt SpO2 92%, -128 throughout session)     Pain:  Pain Assessment  Pain Assessment: 0-10  0-10 (Numeric) Pain Score: 0 - No pain    Objective    Activities of Daily Living:    UE Dressing  UE Dressing Comments:  (Pt don/doff gown seated at recliner w/ SBA.)    LE Dressing  LE Dressing: Yes  LE Dressing Adaptive Equipment: Sock aide, Reacher  Pants Level of Assistance: Minimum assistance  Sock Level of Assistance: Minimum assistance  LE Dressing Where Assessed: Recliner  LE Dressing Comments:  (Pt states has hip kit from mom's previous sx and state she is familiar with AE from helping her  mom. Pt doff/carol socks seated at recliner w/ AE, provided Min A to doff socks with reacher, Min A to thread pants over R foot, provided VC for technique and safety.)    Toileting  Toileting Level of Assistance: Modified independent  Where Assessed: Toilet  Toileting Comments:  (Mod I w/ clothing managment and christopher hygiene in stance.)    Bed Mobility/Transfers:    Transfer 1  Technique 1: Sit to stand, Stand to sit  Transfer Device 1: Walker  Transfer Level of Assistance 1: Modified independent  Trials/Comments 1:  (x1 trial at recliner, x1 trial at toilet. Pt able to maintain precaution)    Functional Mobility: pt ambulated ~25ft x 2 w/fww with CGA for safety during ADL task, pt able to maintain hip precaution.     Standing Balance:  Dynamic Standing Balance  Dynamic Standing-Comments:  (pt tolerated ~6-7 min of dyn standing while completing oral care/ face wash at sink side w/ fww. Provided CGA for safety.)    Outcome Measures:[facility] Daily Activity  Putting on and taking off regular lower body clothing: A little  Bathing (including washing, rinsing, drying): A little  Putting on and taking off regular upper body clothing: None  Toileting, which includes using toilet, bedpan or urinal: A little  Taking care of personal grooming such as brushing teeth: None  Eating Meals: None  Daily Activity - Total Score: 21      Education Documentation  Precautions, taught by MAKENZIE Danielle at 2/28/2025  1:15 PM.  Learner: Patient  Readiness: Acceptance  Method: Explanation, Demonstration, Teach-back  Response: Verbalizes Understanding, Demonstrated Understanding    ADL Training, taught by MAKENZIE Danielle at 2/28/2025  1:15 PM.  Learner: Patient  Readiness: Acceptance  Method: Explanation, Demonstration, Teach-back  Response: Verbalizes Understanding, Demonstrated Understanding    Education Comments  No comments found.        OP EDUCATION:  Education  Individual(s) Educated: Patient  Education Provided: Fall  precautons  Patient Response to Education: Patient/Caregiver Verbalized Understanding of Information  Education Comment:  (Provided handout on hip precautions and discussed safety measures to maintain and include w/ ADL's.  Pt verbalized and demo understanding.)    Goals:  Encounter Problems       Encounter Problems (Active)       OT Goals       pt will dress LB with mod indep (Progressing)       Start:  02/27/25    Expected End:  03/13/25            pt will verbalize and adhere to anterior hip precautions (Progressing)       Start:  02/27/25    Expected End:  03/13/25            Pt will transfer to bed, chair, toilet with CGA (Progressing)       Start:  02/27/25    Expected End:  03/13/25            Pt will bathe/dress UB indep (Progressing)       Start:  02/27/25    Expected End:  03/13/25

## 2025-03-01 ENCOUNTER — PHARMACY VISIT (OUTPATIENT)
Dept: PHARMACY | Facility: CLINIC | Age: 55
End: 2025-03-01
Payer: COMMERCIAL

## 2025-03-01 LAB
ANION GAP SERPL CALC-SCNC: 8 MMOL/L (ref 10–20)
BASOPHILS # BLD AUTO: 0.02 X10*3/UL (ref 0–0.1)
BASOPHILS NFR BLD AUTO: 0.2 %
BUN SERPL-MCNC: 14 MG/DL (ref 6–23)
CALCIUM SERPL-MCNC: 8.2 MG/DL (ref 8.6–10.3)
CHLORIDE SERPL-SCNC: 101 MMOL/L (ref 98–107)
CO2 SERPL-SCNC: 32 MMOL/L (ref 21–32)
CREAT SERPL-MCNC: 0.45 MG/DL (ref 0.5–1.05)
EGFRCR SERPLBLD CKD-EPI 2021: >90 ML/MIN/1.73M*2
EOSINOPHIL # BLD AUTO: 0.07 X10*3/UL (ref 0–0.7)
EOSINOPHIL NFR BLD AUTO: 0.8 %
ERYTHROCYTE [DISTWIDTH] IN BLOOD BY AUTOMATED COUNT: 13.3 % (ref 11.5–14.5)
GLUCOSE SERPL-MCNC: 120 MG/DL (ref 74–99)
HCT VFR BLD AUTO: 24.8 % (ref 36–46)
HGB BLD-MCNC: 8.1 G/DL (ref 12–16)
HOLD SPECIMEN: NORMAL
IMM GRANULOCYTES # BLD AUTO: 0.04 X10*3/UL (ref 0–0.7)
IMM GRANULOCYTES NFR BLD AUTO: 0.5 % (ref 0–0.9)
LYMPHOCYTES # BLD AUTO: 1.47 X10*3/UL (ref 1.2–4.8)
LYMPHOCYTES NFR BLD AUTO: 17.1 %
MAGNESIUM SERPL-MCNC: 2.07 MG/DL (ref 1.6–2.4)
MCH RBC QN AUTO: 30.1 PG (ref 26–34)
MCHC RBC AUTO-ENTMCNC: 32.7 G/DL (ref 32–36)
MCV RBC AUTO: 92 FL (ref 80–100)
MONOCYTES # BLD AUTO: 0.52 X10*3/UL (ref 0.1–1)
MONOCYTES NFR BLD AUTO: 6 %
NEUTROPHILS # BLD AUTO: 6.49 X10*3/UL (ref 1.2–7.7)
NEUTROPHILS NFR BLD AUTO: 75.4 %
NRBC BLD-RTO: 0 /100 WBCS (ref 0–0)
PHOSPHATE SERPL-MCNC: 2.4 MG/DL (ref 2.5–4.9)
PLATELET # BLD AUTO: 105 X10*3/UL (ref 150–450)
POTASSIUM SERPL-SCNC: 4 MMOL/L (ref 3.5–5.3)
RBC # BLD AUTO: 2.69 X10*6/UL (ref 4–5.2)
SODIUM SERPL-SCNC: 137 MMOL/L (ref 136–145)
UFH PPP CHRO-ACNC: 0.5 IU/ML
WBC # BLD AUTO: 8.6 X10*3/UL (ref 4.4–11.3)

## 2025-03-01 PROCEDURE — 2500000002 HC RX 250 W HCPCS SELF ADMINISTERED DRUGS (ALT 637 FOR MEDICARE OP, ALT 636 FOR OP/ED): Performed by: NURSE PRACTITIONER

## 2025-03-01 PROCEDURE — 2500000001 HC RX 250 WO HCPCS SELF ADMINISTERED DRUGS (ALT 637 FOR MEDICARE OP)

## 2025-03-01 PROCEDURE — 2500000004 HC RX 250 GENERAL PHARMACY W/ HCPCS (ALT 636 FOR OP/ED)

## 2025-03-01 PROCEDURE — 99231 SBSQ HOSP IP/OBS SF/LOW 25: CPT

## 2025-03-01 PROCEDURE — 2500000001 HC RX 250 WO HCPCS SELF ADMINISTERED DRUGS (ALT 637 FOR MEDICARE OP): Performed by: NURSE PRACTITIONER

## 2025-03-01 PROCEDURE — 97530 THERAPEUTIC ACTIVITIES: CPT | Mod: GO,CO

## 2025-03-01 PROCEDURE — 84100 ASSAY OF PHOSPHORUS: CPT | Performed by: NURSE PRACTITIONER

## 2025-03-01 PROCEDURE — 36415 COLL VENOUS BLD VENIPUNCTURE: CPT

## 2025-03-01 PROCEDURE — 1200000002 HC GENERAL ROOM WITH TELEMETRY DAILY

## 2025-03-01 PROCEDURE — RXMED WILLOW AMBULATORY MEDICATION CHARGE

## 2025-03-01 PROCEDURE — 83735 ASSAY OF MAGNESIUM: CPT | Performed by: NURSE PRACTITIONER

## 2025-03-01 PROCEDURE — 80051 ELECTROLYTE PANEL: CPT | Performed by: NURSE PRACTITIONER

## 2025-03-01 PROCEDURE — 85520 HEPARIN ASSAY: CPT

## 2025-03-01 PROCEDURE — 36415 COLL VENOUS BLD VENIPUNCTURE: CPT | Performed by: NURSE PRACTITIONER

## 2025-03-01 PROCEDURE — 97530 THERAPEUTIC ACTIVITIES: CPT | Mod: GP,CQ

## 2025-03-01 PROCEDURE — 85025 COMPLETE CBC W/AUTO DIFF WBC: CPT | Performed by: NURSE PRACTITIONER

## 2025-03-01 PROCEDURE — 97535 SELF CARE MNGMENT TRAINING: CPT | Mod: GO,CO

## 2025-03-01 RX ADMIN — ACETAMINOPHEN 650 MG: 325 TABLET ORAL at 18:12

## 2025-03-01 RX ADMIN — HEPARIN SODIUM 1700 UNITS/HR: 10000 INJECTION, SOLUTION INTRAVENOUS at 00:09

## 2025-03-01 RX ADMIN — DOCUSATE SODIUM 100 MG: 100 CAPSULE, LIQUID FILLED ORAL at 21:32

## 2025-03-01 RX ADMIN — OXYCODONE 10 MG: 5 TABLET ORAL at 18:12

## 2025-03-01 RX ADMIN — HEPARIN SODIUM 1700 UNITS/HR: 10000 INJECTION, SOLUTION INTRAVENOUS at 17:13

## 2025-03-01 RX ADMIN — ACETAMINOPHEN 650 MG: 325 TABLET ORAL at 06:48

## 2025-03-01 RX ADMIN — OXYCODONE 10 MG: 5 TABLET ORAL at 08:20

## 2025-03-01 RX ADMIN — DOCUSATE SODIUM 100 MG: 100 CAPSULE, LIQUID FILLED ORAL at 08:20

## 2025-03-01 RX ADMIN — ACETAMINOPHEN 650 MG: 325 TABLET ORAL at 00:09

## 2025-03-01 RX ADMIN — ESCITALOPRAM 7.5 MG: 5 TABLET, FILM COATED ORAL at 21:32

## 2025-03-01 RX ADMIN — LEVOTHYROXINE SODIUM 112 MCG: 112 TABLET ORAL at 06:48

## 2025-03-01 RX ADMIN — ACETAMINOPHEN 650 MG: 325 TABLET ORAL at 13:02

## 2025-03-01 ASSESSMENT — ACTIVITIES OF DAILY LIVING (ADL): HOME_MANAGEMENT_TIME_ENTRY: 26

## 2025-03-01 ASSESSMENT — COGNITIVE AND FUNCTIONAL STATUS - GENERAL
TOILETING: A LITTLE
TURNING FROM BACK TO SIDE WHILE IN FLAT BAD: A LITTLE
TOILETING: A LITTLE
STANDING UP FROM CHAIR USING ARMS: A LITTLE
MOVING FROM LYING ON BACK TO SITTING ON SIDE OF FLAT BED WITH BEDRAILS: A LITTLE
MOVING TO AND FROM BED TO CHAIR: A LITTLE
MOVING TO AND FROM BED TO CHAIR: A LITTLE
WALKING IN HOSPITAL ROOM: A LITTLE
CLIMB 3 TO 5 STEPS WITH RAILING: A LITTLE
TURNING FROM BACK TO SIDE WHILE IN FLAT BAD: A LITTLE
MOBILITY SCORE: 18
CLIMB 3 TO 5 STEPS WITH RAILING: A LITTLE
DRESSING REGULAR LOWER BODY CLOTHING: A LITTLE
STANDING UP FROM CHAIR USING ARMS: A LITTLE
DAILY ACTIVITIY SCORE: 22
CLIMB 3 TO 5 STEPS WITH RAILING: A LITTLE
DRESSING REGULAR LOWER BODY CLOTHING: A LITTLE
MOVING FROM LYING ON BACK TO SITTING ON SIDE OF FLAT BED WITH BEDRAILS: A LITTLE
WALKING IN HOSPITAL ROOM: A LITTLE
WALKING IN HOSPITAL ROOM: A LITTLE
MOBILITY SCORE: 19
HELP NEEDED FOR BATHING: A LITTLE
TURNING FROM BACK TO SIDE WHILE IN FLAT BAD: A LITTLE
MOVING TO AND FROM BED TO CHAIR: A LITTLE
DAILY ACTIVITIY SCORE: 21
STANDING UP FROM CHAIR USING ARMS: A LITTLE
MOBILITY SCORE: 18

## 2025-03-01 ASSESSMENT — PAIN - FUNCTIONAL ASSESSMENT
PAIN_FUNCTIONAL_ASSESSMENT: 0-10

## 2025-03-01 ASSESSMENT — PAIN DESCRIPTION - DESCRIPTORS
DESCRIPTORS: BURNING
DESCRIPTORS: BURNING

## 2025-03-01 ASSESSMENT — PAIN SCALES - GENERAL
PAINLEVEL_OUTOF10: 5 - MODERATE PAIN
PAINLEVEL_OUTOF10: 5 - MODERATE PAIN
PAINLEVEL_OUTOF10: 7
PAINLEVEL_OUTOF10: 4
PAINLEVEL_OUTOF10: 7
PAINLEVEL_OUTOF10: 4

## 2025-03-01 NOTE — PROGRESS NOTES
Hip surgery    Progress Note    Subjective:     Post-Operative Day # 3  Status Post right Hip Arthroplasty    Systemic or Specific Complaints: No Complaints    Objective:     Visit Vitals  /62   Pulse 92   Temp 36.3 °C (97.3 °F)   Resp 16       General: alert and oriented, in no acute distress, appears stated age, and cooperative   Wound: no erythema, no edema, no drainage, and mepilex dressing remains clean, dry and intact   Motion: Painful range of Motion   DVT Exam: No evidence of DVT seen on physical exam.  Negative Pilar's sign.  No significant calf/ankle edema.       NVI in lower extremity. left thigh soft to palpation. Strong equal dorsi/plantar flexion bilaterally. Good distal pulses bilaterally.      Data Review  Recent Results (from the past 24 hours)   Heparin Assay, UFH    Collection Time: 02/28/25  2:30 PM   Result Value Ref Range    Heparin Unfractionated 1.0 See Comment Below for Therapeutic Ranges IU/mL   CBC    Collection Time: 02/28/25  2:30 PM   Result Value Ref Range    WBC 11.5 (H) 4.4 - 11.3 x10*3/uL    nRBC 0.0 0.0 - 0.0 /100 WBCs    RBC 2.88 (L) 4.00 - 5.20 x10*6/uL    Hemoglobin 8.9 (L) 12.0 - 16.0 g/dL    Hematocrit 26.0 (L) 36.0 - 46.0 %    MCV 90 80 - 100 fL    MCH 30.9 26.0 - 34.0 pg    MCHC 34.2 32.0 - 36.0 g/dL    RDW 13.4 11.5 - 14.5 %    Platelets 99 (L) 150 - 450 x10*3/uL   Heparin Assay, UFH    Collection Time: 02/28/25  6:06 PM   Result Value Ref Range    Heparin Unfractionated 0.5 See Comment Below for Therapeutic Ranges IU/mL   Heparin Assay, UFH    Collection Time: 02/28/25 10:21 PM   Result Value Ref Range    Heparin Unfractionated 0.6 See Comment Below for Therapeutic Ranges IU/mL   Basic Metabolic Panel    Collection Time: 03/01/25  3:56 AM   Result Value Ref Range    Glucose 120 (H) 74 - 99 mg/dL    Sodium 137 136 - 145 mmol/L    Potassium 4.0 3.5 - 5.3 mmol/L    Chloride 101 98 - 107 mmol/L    Bicarbonate 32 21 - 32 mmol/L    Anion Gap 8 (L) 10 - 20 mmol/L     Urea Nitrogen 14 6 - 23 mg/dL    Creatinine 0.45 (L) 0.50 - 1.05 mg/dL    eGFR >90 >60 mL/min/1.73m*2    Calcium 8.2 (L) 8.6 - 10.3 mg/dL   Magnesium    Collection Time: 03/01/25  3:56 AM   Result Value Ref Range    Magnesium 2.07 1.60 - 2.40 mg/dL   Phosphorus    Collection Time: 03/01/25  3:56 AM   Result Value Ref Range    Phosphorus 2.4 (L) 2.5 - 4.9 mg/dL   SST TOP    Collection Time: 03/01/25  3:56 AM   Result Value Ref Range    Extra Tube Hold for add-ons.    CBC and Auto Differential    Collection Time: 03/01/25  3:57 AM   Result Value Ref Range    WBC 8.6 4.4 - 11.3 x10*3/uL    nRBC 0.0 0.0 - 0.0 /100 WBCs    RBC 2.69 (L) 4.00 - 5.20 x10*6/uL    Hemoglobin 8.1 (L) 12.0 - 16.0 g/dL    Hematocrit 24.8 (L) 36.0 - 46.0 %    MCV 92 80 - 100 fL    MCH 30.1 26.0 - 34.0 pg    MCHC 32.7 32.0 - 36.0 g/dL    RDW 13.3 11.5 - 14.5 %    Platelets 105 (L) 150 - 450 x10*3/uL    Neutrophils % 75.4 40.0 - 80.0 %    Immature Granulocytes %, Automated 0.5 0.0 - 0.9 %    Lymphocytes % 17.1 13.0 - 44.0 %    Monocytes % 6.0 2.0 - 10.0 %    Eosinophils % 0.8 0.0 - 6.0 %    Basophils % 0.2 0.0 - 2.0 %    Neutrophils Absolute 6.49 1.20 - 7.70 x10*3/uL    Immature Granulocytes Absolute, Automated 0.04 0.00 - 0.70 x10*3/uL    Lymphocytes Absolute 1.47 1.20 - 4.80 x10*3/uL    Monocytes Absolute 0.52 0.10 - 1.00 x10*3/uL    Eosinophils Absolute 0.07 0.00 - 0.70 x10*3/uL    Basophils Absolute 0.02 0.00 - 0.10 x10*3/uL   Heparin Assay, UFH    Collection Time: 03/01/25  4:08 AM   Result Value Ref Range    Heparin Unfractionated 0.5 See Comment Below for Therapeutic Ranges IU/mL     CT angio chest for pulmonary embolism    Result Date: 2/28/2025  Interpreted By:  Matthew Tan, STUDY: CT ANGIO CHEST FOR PULMONARY EMBOLISM;  2/28/2025 10:49 am   INDICATION: 55 y/o   F with  Signs/Symptoms:SOB with excertion..   COMPARISON: None available.   ACCESSION NUMBER(S): [id number]   ORDERING CLINICIAN: AFUA DELGADO   TECHNIQUE: CT images of the  chest obtained following the administration of  75 ml of IV contrast (Omnipaque 350) in the pulmonary arterial phase of contrast. Axial and coronal MIP images, as well as coronal sagittal reformatted images generated and reviewed.   FINDINGS: LIMITATIONS/LINES:   None.   PULMONARY ARTERIES:   Filling defect in segmental branch with extension to subsegmental branches in lateral segment of right middle lobe. Filling defect also observed in subsequent branch to medial basal segment of right lower lobe. No additional filling defects identified   HEART:   Heart is within normal limits of size. No significant pericardial effusion.   MEDIASTINUM/JESSY:   Unremarkable.   PLEURA:   Unremarkable.   LUNG PARENCHYMA:   Unremarkable.   BONES:   Unremarkable.   CHEST WALL/OTHER:   Unremarkable.       Pulmonary emboli segmental branch right middle lobe and subsegmental branch right lower lobe.   MACRO: Matthew Tan discussed the significance and urgency of this critical finding by telephone with  AFUA DELGADO on 2/28/2025 at 11:13 am. (**-RCF-**) Findings:  Acute pulmonary embolus.   None   Signed by: Matthew Tan 2/28/2025 11:16 AM Dictation workstation:   [id number]    XR pelvis 1-2 views    Result Date: 2/26/2025  Interpreted By:  Jay Jay Phan, STUDY: XR PELVIS 1-2 VIEWS;  2/26/2025 10:30 am   INDICATION: Signs/Symptoms:Post op hip.     COMPARISON: Pre-surgical Right hip and/or pelvis series, 26 November 2024   ACCESSION NUMBER(S): [id number]   ORDERING CLINICIAN: DIDI HORTON   TECHNIQUE: Frontal view of the right hip obtained portably in the immediate or near-immediate postoperative setting   FINDINGS: No acute unexpected radiographic findings shortly after right hip replacement; refer to the operative report       As above     MACRO: None   Signed by: Jay Jay Phan 2/26/2025 1:56 PM Dictation workstation:   [id number]    FL fluoro images no charge    Result Date: 2/26/2025  These images are not reportable by radiology  and will not be interpreted by  Radiologists.    BI mammo bilateral screening tomosynthesis    Result Date: 2/25/2025  * * *Final Report* * * DATE OF EXAM: Feb 25 2025  1:51PM   LNW   [id number]  -  ARTEMIO SCREENING W FREDI  / ACCESSION #  [id number] PROCEDURE REASON: Encounter for screening mammogram for breast cancer      * * * * Physician Interpretation * * * * RESULT: [address] [place] [address] Phone: [phone] #[phone] - ARTEMIO SCREENING W FREDI HISTORY: 54 year-old patient seen for screening. No current complaints. Patient states no personal history of breast cancer. COMPARISON STUDIES: The present examination has been compared to prior imaging studies dated 02/28/2019 (mammogram), 07/10/2020 (mammogram), 04/20/2022 (mammogram), 08/22/2023 (mammogram) and 09/30/2023 (mammogram). MAMMOGRAM TECHNIQUE: The study was acquired using full field digital technology and interpreted from soft copy. Digital Breast Tomosynthesis (DBT) images were obtained and used to assist in the interpretation of this examination. MAMMOGRAM FINDINGS: There are scattered areas of fibroglandular density. No suspicious masses, calcifications or other abnormalities are seen in either breast. There are no significant interval changes.    IMPRESSION: There is no mammographic evidence of malignancy in either breast. Routine screening mammogram is recommended. Annual mammogram will be due in 1 year. BI-RADS Category 1: Negative RISK:  Based on the Tyrer-Cuzick (TC) risk assessment model, this patient has a 21.9%  lifetime risk of developing breast cancer, meaning they are at high risk for developing breast cancer. However, this is only an estimate based on available history provided on the patient's questionnaire. Because patients with a lifetime risk of 20% or greater may benefit from additional supplemental screening, we encourage a full breast clinical evaluation and comprehensive breast  cancer risk assessment to guide further decision making. For more information regarding the management of high-risk patients, the following is a link to the [facility] care path https://ccf.[employer]/dotNet/documents/?iwble=43590. Additionally, a referral to the [facility] is also appropriate. Interpreting Radiologist: Edgar Wilson M.D. Electronically signed on: 02/25/2025 : JANET Transcribe Date/Time: Feb 25 2025  1:36P Dictated by: EDGAR WILSON MD This examination was interpreted and the report reviewed and electronically signed by: EDGAR WILSON MD on Feb 25 2025  3:18PM  EST    ECG 12 Lead    Result Date: 2/12/2025  Normal sinus rhythm Normal ECG When compared with ECG of 16-SEP-2008 11:02, No significant change was found Confirmed by Angel Ascencio ([id number]) on 2/12/2025 6:56:19 PM    MR knee left wo IV contrast    Result Date: 2/5/2025  Interpreted By:  Ankush Cuello and Ritchie Brandon STUDY: MRI of the left knee without contrast dated 2/5/2025.   INDICATION: Signs/Symptoms:pain   COMPARISON: Radiographs of the left knee 01/30/2025..   ACCESSION NUMBER(S): [id number]   ORDERING CLINICIAN: ALTAGRACIA RODRIGUEZ   TECHNIQUE: Multiplanar multisequence MRI of the left knee was performed  without intravenous contrast.   FINDINGS: MUSCLES, TENDONS, AND LIGAMENTS:   The anterior cruciate ligament is intact.  The posterior cruciate ligament is intact. The medial collateral ligament is intact. The lateral collateral ligament complex is intact. The popliteus and biceps femoris tendons, iliotibial band, and extensor mechanism are intact.   MENISCI:   There is truncation of the inner free edge of the posterior horn of the medial meniscus near the root ligament junction.  The lateral meniscus is intact.   OSSEOUS STRUCTURES AND JOINTS:   No fracture or dislocation is evident.   There is mild hyaline articular cartilage of the medial and lateral femorotibial joint spaces  without evidence of full thickness cartilage defect. There is thinning of the hyaline articular cartilage of the patellar apex with some fissuring on the lateral facet. There is a small volume knee joint effusion.   SOFT TISSUES:   No significant volume of fluid is evident in a popliteal cyst.       1. Free margin radial tearing of the posterior horn of the medial meniscus near the root ligament junction. 2. Mild degenerative change of the knee.   I personally reviewed the images/study and I agree with the findings as stated by resident Cristino Murry. This study was interpreted at [place], [place].   Signed by: Ankush Cuello 2/5/2025 1:40 PM Dictation workstation:   [id number]    XR knee left 4+ views    Result Date: 1/31/2025  Interpreted By:  Parish Lind, STUDY: XR KNEE LEFT 4+ VIEWS; ;  1/30/2025 3:26 pm   INDICATION: Signs/Symptoms:pain.   ,M25.562 Pain in left knee   COMPARISON: None.   ACCESSION NUMBER(S): [id number]   ORDERING CLINICIAN: ALTAGRACIA RODRIGUEZ   FINDINGS: Left knee, four views   There is no fracture. There is no dislocation. There are no degenerative changes. There is no lytic or sclerotic lesion. There is no soft tissue abnormality seen. There is no effusion       Normal radiographs the left knee     MACRO: None   Signed by: Parish Lind 1/31/2025 7:16 PM Dictation workstation:   [id number]      Assessment:     Status Post left Hip Arthroplasty. Doing well postoperatively. No acute events overnight.     Acute postoperative pain: Reports mild to moderate pain to operative extremity. Exacerbated by movement, relieved by rest ice and pain medication. Continue current pain management.     2/27: Patient transferred to regular medical floor. No longer requiring wyatt gtt. Vitals stable. Hgb stable at 10.0.     2/28: Patient tachycardic overnight and this AM. While working with PT this morning she reported feeling SOB and hot. CTA chest  positive for right middle and lower lobe PE.     2/29: Patient reports feeling better today. Moving well with therapy. Hep gtt initiated yesterday for PE. Not requiring oxygen. Denies any SOB today or chest pain. Will continue to monitor.     Plan:      1.  Continues current post-op course   2.  Continue Deep venous thrombosis prophylaxis: Hep gtt drip for 48 hrs then will be discharged on eliquis loading dose pack   3.  Continue physical therapy: WBAT with anterior hip precautions to operative extremity  4.  Continue Pain Control: scripts sent  5.  Discharge planning: patient plans to return to home with  home care at discharge, orders placed. SW and TCC following for discharge planning. Anticipate earliest discharge 2/2. Will need follow up with ortho in 2 weeks and PCP in 1 week      SORAYA Roberson-CNP   3/1/2025 8:13 AM

## 2025-03-01 NOTE — CARE PLAN
The patient's goals for the shift include      The clinical goals for the shift include Pain control    Over the shift, the patient did not make progress toward the following goals. Barriers to progression include recent surgery. Recommendations to address these barriers include return to plan of care.

## 2025-03-01 NOTE — PROGRESS NOTES
Health Maintenance Summary     Topic Due On Due Status Completed On Postpone Until Reason    MAMMOGRAM - BREAST CANCER SCREENING Jun 6, 2016 Overdue Jun 6, 2014      Colorectal Cancer Screening - Colonoscopy Nov 5, 2018 Not Due Nov 5, 2008      Osteoporosis Screening  Completed Nov 9, 2015      Immunization-Zoster Jan 29, 2007 Postponed  Oct 23, 2017 Patient Refused    Immunization - Pneumococcal  Completed Feb 2, 2017      Immunization - TDAP Pregnancy  Hidden       Medicare Wellness Visit Oct 23, 2018 Not Due Oct 23, 2017      IMMUNIZATION - DTaP/Tdap/Td Oct 11, 2008 Postponed Oct 10, 2008 Oct 23, 2017 Patient Refused    Immunization-Influenza  Completed Oct 23, 2017      Hepatitis C Screening Jan 29, 1998 Overdue             Patient is due for topics as listed above, she wishes to discuss with provider.       Occupational Therapy    OT Treatment    Patient Name: Cindy Deluna  MRN: [id number]  Department: [facility]  Room: [address] [place] [address]  Today's Date: 3/1/2025  Time Calculation  Start Time: 0835  Stop Time: 0918  Time Calculation (min): 43 min        Assessment:  End of Session Communication: Bedside nurse, PCT/NA/CTA  End of Session Patient Position: Up in chair, Alarm on (all needs met, call light within reach)     Plan:  Treatment Interventions: ADL retraining, Functional transfer training, Endurance training, Patient/family training  OT Frequency: 2 times per week  OT Discharge Recommendations: Low intensity level of continued care  OT Recommended Transfer Status: Minimal assist  OT - OK to Discharge: Yes  Treatment Interventions: ADL retraining, Functional transfer training, Endurance training, Patient/family training    Subjective   Previous Visit Info:  OT Last Visit  OT Received On: 03/01/25  General:  General  Past Medical History Relevant to Rehab: arthritis, anxiety, hypothyroidism, Hashimotos, hyster  Prior to Session Communication: Bedside nurse, PCT/NA/CTA (cleared for participation in OT tx)  Patient Position Received: Up in chair, Alarm off, not on at start of session  General Comment: Pt pleasant and cooperative, agreeable to OT  Precautions:  LE Weight Bearing Status: Weight Bearing as Tolerated (R LE)  Medical Precautions: Fall precautions  Post-Surgical Precautions: Right hip precautions (anterior)  Precautions Comment: ANNA, anterior approach  , 2/26 (Pt verbalized and demo'd understanding of hip precautions)        Vital Signs Comment: SpO2 96-98% on RA.  Pt requesting to reapply NC at 2L (already on, on wall unit)     Pain:  Pain Assessment  Pain Assessment: 0-10  0-10 (Numeric) Pain Score: 5 - Moderate pain  Pain Type: Acute pain, Surgical pain  Pain Location: Leg  Pain Orientation: Right  Pain Interventions: Medication (See MAR), Repositioned, Ambulation/increased activity    Objective     Cognition:  Cognition  Orientation Level: Oriented X4       Activities of Daily Living: Grooming  Grooming Comments: Sinkside standing for G/H tasks. SBA for safety with carryover. VCs for walker placement at sinkside. Fair + dyn standing balance x 5 in    LE Dressing  LE Dressing Adaptive Equipment: Reacher  Pants Level of Assistance:  (SBA)  LE Dressing Where Assessed: Chair  LE Dressing Comments: Pt demo'd good understanding of  safety precautions and use of reacher.    Toileting  Toileting Comments: SBA for toilet transfer.  SBA for christopher hygiene and clothing management in stance  Functional Standing Tolerance:     Bed Mobility/Transfers: Transfer 1  Trials/Comments 1: STS from multiple surfaces with supervision      Functional Mobility:  Functional Mobility  Functional Mobility Performed:  (Functional mobility in room between adl tasks and on unit with fww and SBA)  Sitting Balance:  Static Sitting Balance  Static Sitting-Comment/Number of Minutes: good  Dynamic Sitting Balance  Dynamic Sitting-Comments: good  Standing Balance:  Static Standing Balance  Static Standing-Comment/Number of Minutes: good-  Dynamic Standing Balance  Dynamic Standing-Comments: fair+    Therapy/Activity: Balance/Neuromuscular Re-Education  Balance/Neuromuscular Re-Education Activity Performed:  (Multi-level dynamic reaching (L/R/C/Above head/Across midline) with x1 UE support. No LOB.)    Outcome Measures:[facility] Daily Activity  Putting on and taking off regular lower body clothing: A little  Bathing (including washing, rinsing, drying): A little  Putting on and taking off regular upper body clothing: None  Toileting, which includes using toilet, bedpan or urinal: A little  Taking care of personal grooming such as brushing teeth: None  Eating Meals: None  Daily Activity - Total Score: 21        Education Documentation  Precautions, taught by Jennifer L Felty, OTA at 3/1/2025 12:56 PM.  Learner: Patient  Readiness: Acceptance  Method:  Explanation, Demonstration  Response: Verbalizes Understanding    ADL Training, taught by Jennifer L Felty, OTA at 3/1/2025 12:56 PM.  Learner: Patient  Readiness: Acceptance  Method: Explanation, Demonstration  Response: Verbalizes Understanding      EDUCATION: Pt educated on fall prevention techniques; safe transfer techniques including hand placement and positioning; techniques/strategies for balance improvement; compensatory adl techniques; safe body mechanics; energy conservation techniques.   Pt able to independently state anterior hip precautions.  Pt demo'd good adherence to precautions during adl tasks.       Goals:  Encounter Problems       Encounter Problems (Active)       OT Goals       pt will dress LB with mod indep (Progressing)       Start:  02/27/25    Expected End:  03/13/25            pt will verbalize and adhere to anterior hip precautions (Progressing)       Start:  02/27/25    Expected End:  03/13/25            Pt will transfer to bed, chair, toilet with CGA (Progressing)       Start:  02/27/25    Expected End:  03/13/25            Pt will bathe/dress UB indep (Progressing)       Start:  02/27/25    Expected End:  03/13/25

## 2025-03-01 NOTE — PROGRESS NOTES
Physical Therapy                 Therapy Communication Note    Patient Name: Cindy Deluna  MRN: [id number]  Department: [facility]  Room: [address] [place], [place] [address]  Today's Date: 3/1/2025     Discipline: Physical Therapy        Comment: PT frequency decreased from BID to Daily, starting 3/2/25, d/t extended hospital stay for medical complications.

## 2025-03-01 NOTE — PROGRESS NOTES
Physical Therapy    Physical Therapy Treatment    Patient Name: Cindy Deluna  MRN: [id number]  Department: [facility]  Room: [address] [place] [address]  Today's Date: 3/1/2025  Time Calculation  Start Time: 0955  Stop Time: 1010  Time Calculation (min): 15 min         Assessment/Plan   PT Assessment  PT Assessment Results: Decreased strength, Decreased range of motion, Decreased endurance, Impaired balance, Decreased mobility, Pain  Rehab Prognosis: Good  End of Session Communication: Bedside nurse  Assessment Comment: pt participating well and progressing towards goals  End of Session Patient Position: Up in chair, Alarm on  PT Plan  Inpatient/Swing Bed or Outpatient: Inpatient  PT Plan  Treatment/Interventions: Transfer training, Gait training, Stair training, Therapeutic exercise  PT Plan: Ongoing PT  PT Frequency: BID  PT Discharge Recommendations: Low intensity level of continued care  Equipment Recommended upon Discharge:  (patient reports she has the equipment she needs at home)  PT Recommended Transfer Status: Assist x1  General Visit Information:   PT  Visit  PT Received On: 03/01/25  General  Reason for Referral: R THR  Referred By: Loni PT/OT gil and tx 2/26/25  Past Medical History Relevant to Rehab: arthritis, anxiety, hypothyroidism, Hashimotos, hyster  Prior to Session Communication: Bedside nurse, PCT/NA/CTA  Patient Position Received: Up in chair, Alarm off, not on at start of session  General Comment: Pt is pleasant and agreeable to PT treatment.    Subjective   Precautions:  Precautions  LE Weight Bearing Status: Weight Bearing as Tolerated (RLE)  Medical Precautions: Fall precautions  Post-Surgical Precautions: Right hip precautions (Anterior)  Precautions Comment: ANNA, anterior approach  , 2/26     Date/Time Vitals Session Patient Position Pulse Resp SpO2 BP MAP (mmHg)    03/01/25 11:30:07 --  --  97  --  96 %  105/59  77                 Objective   Pain:  Pain Assessment  Pain Assessment: 0-10  0-10  (Numeric) Pain Score: 5 - Moderate pain  Pain Type: Surgical pain  Pain Location: Leg  Pain Orientation: Right  Pain Descriptors: Burning  Pain Interventions: Medication (See MAR), Cold applied  Cognition:  Cognition  Orientation Level: Oriented X4  Coordination:     Postural Control:     Extremity/Trunk Assessments:        Activity Tolerance:  Activity Tolerance  Endurance: Endurance does not limit participation in activity  Treatments:  Therapeutic Exercise  Therapeutic Exercise Performed: Yes (Pt finishing ther ex upon arrival to room and demonstrates good knowledge of exercises.)    Ambulation/Gait Training  Ambulation/Gait Training Performed: Yes (Pt ambulating 150' with WW at supervision level. Pt demos a slow, reciprocal gait with fair step height/length.)  Transfers  Transfer: Yes (Sit<>stand from chair at supervision level with WW.)    Stairs  Stairs: Yes (Pt climbing 5 steps with R hand rail and quad cane/SBA with non reciprocal gait and good return demo. Pt reports using quad cane at home vs SPC.)    Outcome Measures:  [facility] Basic Mobility  Turning from your back to your side while in a flat bed without using bedrails: A little  Moving from lying on your back to sitting on the side of a flat bed without using bedrails: A little  Moving to and from bed to chair (including a wheelchair): A little  Standing up from a chair using your arms (e.g. wheelchair or bedside chair): A little  To walk in hospital room: A little  Climbing 3-5 steps with railing: A little  Basic Mobility - Total Score: 18    Education Documentation  Handouts, taught by Tiana Barnes PTA at 3/1/2025 12:04 PM.  Learner: Patient  Readiness: Acceptance  Method: Explanation  Response: Verbalizes Understanding    Precautions, taught by Tiana Barnes PTA at 3/1/2025 12:04 PM.  Learner: Patient  Readiness: Acceptance  Method: Explanation  Response: Verbalizes Understanding    Home Exercise Program, taught by Tiana Barnes PTA at  3/1/2025 12:04 PM.  Learner: Patient  Readiness: Acceptance  Method: Explanation  Response: Verbalizes Understanding    Mobility Training, taught by Tiana Barnes PTA at 3/1/2025 12:04 PM.  Learner: Patient  Readiness: Acceptance  Method: Explanation  Response: Verbalizes Understanding    Education Comments  No comments found.        EDUCATION:  Outpatient Education  Individual(s) Educated: Patient  Education Provided: Body Mechanics, Home Exercise Program, Fall Risk, Post-Op Precautions    Encounter Problems       Encounter Problems (Active)       PT Problem       Patient will complete bed mobility indep without use of bed rails (Progressing)       Start:  02/27/25    Expected End:  03/13/25            Patient will complete functional transfers with wheeled walker MI (Progressing)       Start:  02/27/25    Expected End:  03/13/25            Patient will be indep with HEP for LE ROM and strengthening (Progressing)       Start:  02/27/25    Expected End:  03/13/25            Patient will ambulate 200' with wheeled walker and supervision (Progressing)       Start:  02/27/25    Expected End:  03/13/25                Patient will ascend/descend 12 stairs with on handrail and supervision (Progressing)       Start:  02/27/25    Expected End:  03/13/25                   Pain - Adult

## 2025-03-01 NOTE — PROGRESS NOTES
Physical Therapy    Physical Therapy Treatment    Patient Name: Cindy Deluna  MRN: [id number]  Department: [facility]  Room: [address] [place] [address]  Today's Date: 3/1/2025  Time Calculation  Start Time: 1311  Stop Time: 1330  Time Calculation (min): 19 min         Assessment/Plan   PT Assessment  PT Assessment Results: Decreased strength, Decreased range of motion, Decreased endurance, Impaired balance, Decreased mobility, Pain  Rehab Prognosis: Good  End of Session Communication: Bedside nurse  Assessment Comment: pt participating well and progressing towards goals  End of Session Patient Position: Up in chair, Alarm on  PT Plan  Inpatient/Swing Bed or Outpatient: Inpatient  PT Plan  Treatment/Interventions: Bed mobility, Transfer training, Gait training, Stair training, Therapeutic exercise  PT Plan: Ongoing PT  PT Frequency: BID  PT Discharge Recommendations: Low intensity level of continued care  Equipment Recommended upon Discharge:  (patient reports she has the equipment she needs at home)  PT Recommended Transfer Status: Assist x1    General Visit Information:   PT  Visit  PT Received On: 03/01/25  General  Reason for Referral: R THR  Referred By: Loni PT/OT gil and tx 2/26/25  Past Medical History Relevant to Rehab: arthritis, anxiety, hypothyroidism, Hashimotos, hyster  Prior to Session Communication: Bedside nurse  Patient Position Received: Bed, 3 rail up, Alarm off, not on at start of session  General Comment: Pt is pleasant and agreeable to PT treatment.    Subjective   Precautions:  Precautions  LE Weight Bearing Status: Weight Bearing as Tolerated (RLE)  Medical Precautions: Fall precautions  Post-Surgical Precautions: Right hip precautions (Anterior)  Precautions Comment: ANNA, anterior approach  , 2/26            Objective   Pain:  Pain Assessment  Pain Assessment: 0-10  0-10 (Numeric) Pain Score: 4  Pain Type: Surgical pain  Pain Location: Leg  Pain Orientation: Right  Pain Descriptors: Burning  Pain  Interventions: Medication (See MAR)  Cognition:  Cognition  Orientation Level: Oriented X4  Coordination:     Postural Control:     Extremity/Trunk Assessments:        Activity Tolerance:  Activity Tolerance  Endurance: Endurance does not limit participation in activity  Treatments:  Therapeutic Exercise  Therapeutic Exercise Performed: Yes (Supine AP, QS, GS x 10ea BLE)    Bed Mobility  Bed Mobility: Yes (Sup>sit at SBA with HOB elevated.)    Ambulation/Gait Training  Ambulation/Gait Training Performed: Yes (Pt ambulating 150' with WW and Sup . Pt ambulating with a slow, reciprocal gait and fair step height/length.)  Transfers  Transfer: Yes (Sit<>stand from EOB, chair and toilet at sup level with WW.)    Stairs  Stairs: Yes (Stair climbing x 10 steps with R hr and quad cane with SBA and non reciprocal gait with good return demo.)    Outcome Measures:  [facility] Basic Mobility  Turning from your back to your side while in a flat bed without using bedrails: A little  Moving from lying on your back to sitting on the side of a flat bed without using bedrails: A little  Moving to and from bed to chair (including a wheelchair): A little  Standing up from a chair using your arms (e.g. wheelchair or bedside chair): A little  To walk in hospital room: A little  Climbing 3-5 steps with railing: A little  Basic Mobility - Total Score: 18    Education Documentation  Handouts, taught by Tiana Barnes PTA at 3/1/2025  2:16 PM.  Learner: Patient  Readiness: Acceptance  Method: Explanation  Response: Verbalizes Understanding    Precautions, taught by Tiana Barnes PTA at 3/1/2025  2:16 PM.  Learner: Patient  Readiness: Acceptance  Method: Explanation  Response: Verbalizes Understanding    Home Exercise Program, taught by Tiana Barnes PTA at 3/1/2025  2:16 PM.  Learner: Patient  Readiness: Acceptance  Method: Explanation  Response: Verbalizes Understanding    Mobility Training, taught by Tiana Barnes PTA at 3/1/2025   2:16 PM.  Learner: Patient  Readiness: Acceptance  Method: Explanation  Response: Verbalizes Understanding    Handouts, taught by Tiana Barnse PTA at 3/1/2025 12:04 PM.  Learner: Patient  Readiness: Acceptance  Method: Explanation  Response: Verbalizes Understanding    Precautions, taught by Tiana Barnes PTA at 3/1/2025 12:04 PM.  Learner: Patient  Readiness: Acceptance  Method: Explanation  Response: Verbalizes Understanding    Home Exercise Program, taught by Tiana Barnes PTA at 3/1/2025 12:04 PM.  Learner: Patient  Readiness: Acceptance  Method: Explanation  Response: Verbalizes Understanding    Mobility Training, taught by Tiana Barnes PTA at 3/1/2025 12:04 PM.  Learner: Patient  Readiness: Acceptance  Method: Explanation  Response: Verbalizes Understanding    Education Comments  No comments found.        EDUCATION:  Outpatient Education  Individual(s) Educated: Patient  Education Provided: Body Mechanics, Home Exercise Program, Fall Risk, Post-Op Precautions    Encounter Problems       Encounter Problems (Active)       PT Problem       Patient will complete bed mobility indep without use of bed rails (Progressing)       Start:  02/27/25    Expected End:  03/13/25            Patient will complete functional transfers with wheeled walker MI (Progressing)       Start:  02/27/25    Expected End:  03/13/25            Patient will be indep with HEP for LE ROM and strengthening (Progressing)       Start:  02/27/25    Expected End:  03/13/25            Patient will ambulate 200' with wheeled walker and supervision (Progressing)       Start:  02/27/25    Expected End:  03/13/25                Patient will ascend/descend 12 stairs with on handrail and supervision (Progressing)       Start:  02/27/25    Expected End:  03/13/25                   Pain - Adult

## 2025-03-02 VITALS
BODY MASS INDEX: 33.34 KG/M2 | SYSTOLIC BLOOD PRESSURE: 113 MMHG | HEART RATE: 94 BPM | DIASTOLIC BLOOD PRESSURE: 68 MMHG | OXYGEN SATURATION: 93 % | WEIGHT: 207.45 LBS | RESPIRATION RATE: 16 BRPM | HEIGHT: 66 IN | TEMPERATURE: 96.8 F

## 2025-03-02 LAB
BLOOD EXPIRATION DATE: NORMAL
BLOOD EXPIRATION DATE: NORMAL
DISPENSE STATUS: NORMAL
DISPENSE STATUS: NORMAL
ERYTHROCYTE [DISTWIDTH] IN BLOOD BY AUTOMATED COUNT: 13.2 % (ref 11.5–14.5)
HCT VFR BLD AUTO: 23.8 % (ref 36–46)
HGB BLD-MCNC: 7.8 G/DL (ref 12–16)
MCH RBC QN AUTO: 30.7 PG (ref 26–34)
MCHC RBC AUTO-ENTMCNC: 32.8 G/DL (ref 32–36)
MCV RBC AUTO: 94 FL (ref 80–100)
NRBC BLD-RTO: 0.3 /100 WBCS (ref 0–0)
PHOSPHATE SERPL-MCNC: 3 MG/DL (ref 2.5–4.9)
PLATELET # BLD AUTO: 137 X10*3/UL (ref 150–450)
PRODUCT BLOOD TYPE: 5100
PRODUCT BLOOD TYPE: 5100
PRODUCT CODE: NORMAL
PRODUCT CODE: NORMAL
RBC # BLD AUTO: 2.54 X10*6/UL (ref 4–5.2)
UFH PPP CHRO-ACNC: 0.4 IU/ML
UNIT ABO: NORMAL
UNIT ABO: NORMAL
UNIT NUMBER: NORMAL
UNIT NUMBER: NORMAL
UNIT RH: NORMAL
UNIT RH: NORMAL
UNIT VOLUME: 350
UNIT VOLUME: 350
WBC # BLD AUTO: 6.7 X10*3/UL (ref 4.4–11.3)
XM INTEP: NORMAL
XM INTEP: NORMAL

## 2025-03-02 PROCEDURE — 84100 ASSAY OF PHOSPHORUS: CPT | Performed by: NURSE PRACTITIONER

## 2025-03-02 PROCEDURE — 97116 GAIT TRAINING THERAPY: CPT | Mod: GP,CQ

## 2025-03-02 PROCEDURE — 85027 COMPLETE CBC AUTOMATED: CPT | Performed by: ORTHOPAEDIC SURGERY

## 2025-03-02 PROCEDURE — 85520 HEPARIN ASSAY: CPT | Performed by: ORTHOPAEDIC SURGERY

## 2025-03-02 PROCEDURE — 97110 THERAPEUTIC EXERCISES: CPT | Mod: GP,CQ

## 2025-03-02 PROCEDURE — 2500000001 HC RX 250 WO HCPCS SELF ADMINISTERED DRUGS (ALT 637 FOR MEDICARE OP)

## 2025-03-02 PROCEDURE — 2500000004 HC RX 250 GENERAL PHARMACY W/ HCPCS (ALT 636 FOR OP/ED)

## 2025-03-02 PROCEDURE — 2500000001 HC RX 250 WO HCPCS SELF ADMINISTERED DRUGS (ALT 637 FOR MEDICARE OP): Performed by: NURSE PRACTITIONER

## 2025-03-02 PROCEDURE — 2500000002 HC RX 250 W HCPCS SELF ADMINISTERED DRUGS (ALT 637 FOR MEDICARE OP, ALT 636 FOR OP/ED): Performed by: NURSE PRACTITIONER

## 2025-03-02 PROCEDURE — 36415 COLL VENOUS BLD VENIPUNCTURE: CPT | Performed by: NURSE PRACTITIONER

## 2025-03-02 RX ADMIN — ACETAMINOPHEN 650 MG: 325 TABLET ORAL at 12:19

## 2025-03-02 RX ADMIN — OXYCODONE 10 MG: 5 TABLET ORAL at 05:43

## 2025-03-02 RX ADMIN — LEVOTHYROXINE SODIUM 112 MCG: 112 TABLET ORAL at 05:43

## 2025-03-02 RX ADMIN — DOCUSATE SODIUM 100 MG: 100 CAPSULE, LIQUID FILLED ORAL at 08:34

## 2025-03-02 RX ADMIN — APIXABAN 10 MG: 5 TABLET, FILM COATED ORAL at 12:19

## 2025-03-02 RX ADMIN — HEPARIN SODIUM 1700 UNITS/HR: 10000 INJECTION, SOLUTION INTRAVENOUS at 07:05

## 2025-03-02 RX ADMIN — OXYCODONE 10 MG: 5 TABLET ORAL at 12:19

## 2025-03-02 RX ADMIN — ACETAMINOPHEN 650 MG: 325 TABLET ORAL at 05:43

## 2025-03-02 ASSESSMENT — COGNITIVE AND FUNCTIONAL STATUS - GENERAL
DRESSING REGULAR LOWER BODY CLOTHING: A LITTLE
MOVING TO AND FROM BED TO CHAIR: A LITTLE
WALKING IN HOSPITAL ROOM: A LITTLE
MOVING TO AND FROM BED TO CHAIR: A LITTLE
MOBILITY SCORE: 19
TURNING FROM BACK TO SIDE WHILE IN FLAT BAD: A LITTLE
MOVING FROM LYING ON BACK TO SITTING ON SIDE OF FLAT BED WITH BEDRAILS: A LITTLE
DAILY ACTIVITIY SCORE: 22
MOBILITY SCORE: 19
TOILETING: A LITTLE
STANDING UP FROM CHAIR USING ARMS: A LITTLE
WALKING IN HOSPITAL ROOM: A LITTLE
CLIMB 3 TO 5 STEPS WITH RAILING: A LITTLE
TURNING FROM BACK TO SIDE WHILE IN FLAT BAD: A LITTLE
CLIMB 3 TO 5 STEPS WITH RAILING: A LITTLE

## 2025-03-02 ASSESSMENT — PAIN - FUNCTIONAL ASSESSMENT
PAIN_FUNCTIONAL_ASSESSMENT: 0-10
PAIN_FUNCTIONAL_ASSESSMENT: 0-10
PAIN_FUNCTIONAL_ASSESSMENT: WONG-BAKER FACES

## 2025-03-02 ASSESSMENT — PAIN SCALES - GENERAL
PAINLEVEL_OUTOF10: 7
PAINLEVEL_OUTOF10: 7
PAINLEVEL_OUTOF10: 3

## 2025-03-02 ASSESSMENT — PAIN SCALES - WONG BAKER: WONGBAKER_NUMERICALRESPONSE: HURTS LITTLE BIT

## 2025-03-02 NOTE — PROGRESS NOTES
"Cinyd Deluna is a 54 y.o. female on day 4 of admission presenting with Unilateral primary osteoarthritis, right hip.    Subjective   Feeling better when she gets up and walks but still feels like she needs the pain medication denies being lightheaded.  She has noticed some sores along her medial thigh which she thinks is from the compressive bandage that was wrapped around her waist.       Objective     Physical Exam  Hip bandages clean and dry there is some moderate swelling and bruising in the thigh area there is a linear area of erythema and small ulceration along the medial thigh without signs of secondary infection some slight serous oozing distally she is neurologically intact calf is nontender compartments are soft.  Last Recorded Vitals  Blood pressure 113/68, pulse 94, temperature 36 °C (96.8 °F), resp. rate 16, height 1.676 m (5' 6\"), weight 94.1 kg (207 lb 7.3 oz), SpO2 93%.  Intake/Output last 3 Shifts:  No intake/output data recorded.    Relevant Results  H&H this morning 7.8 and 23                       Assessment/Plan   Assessment & Plan  Hypothyroidism    Obesity    Primary localized osteoarthritis of right hip    Status post right hip replacement with postoperative pulmonary embolism         Plan is discharged per medical service on [facility] follow-up with Dr. Ivey 2 weeks.        Kanu Byrd MD      "

## 2025-03-02 NOTE — NURSING NOTE
Discharge instructions explained to patient and patients . Discussed follow up appointments, new medications, ELIA hose and signs/symptoms.

## 2025-03-02 NOTE — PROGRESS NOTES
Physical Therapy    Physical Therapy Treatment    Patient Name: Cindy Deluna  MRN: [id number]  Department: [facility]  Room: [address] [place] [address]  Today's Date: 3/2/2025  Time Calculation  Start Time: 0844  Stop Time: 0907  Time Calculation (min): 23 min         Assessment/Plan   PT Assessment  PT Assessment Results: Decreased strength, Decreased range of motion, Decreased endurance, Impaired balance, Decreased mobility, Pain  Rehab Prognosis: Good  End of Session Communication: Bedside nurse  Assessment Comment: pt participating well and progressing towards goals  End of Session Patient Position: Up in chair, Alarm off, not on at start of session  PT Plan  Inpatient/Swing Bed or Outpatient: Inpatient  PT Plan  Treatment/Interventions: Transfer training, Gait training, Stair training, Therapeutic exercise  PT Plan: Ongoing PT  PT Frequency: Daily  PT Discharge Recommendations: Low intensity level of continued care  Equipment Recommended upon Discharge:  (patient reports she has the equipment she needs at home)  PT Recommended Transfer Status: Assist x1    General Visit Information:   PT  Visit  PT Received On: 03/02/25  General  Reason for Referral: R THR  Referred By: Loni PT/OT gil and tx 2/26/25  Past Medical History Relevant to Rehab: arthritis, anxiety, hypothyroidism, Hashimotos, hyster  Prior to Session Communication: Bedside nurse  Patient Position Received: Up in chair, Alarm off, not on at start of session  General Comment: Pt is pleasant and agreeable to PT treatment.    Subjective   Precautions:  Precautions  LE Weight Bearing Status: Weight Bearing as Tolerated (RLE)  Medical Precautions: Fall precautions  Post-Surgical Precautions: Right hip precautions (Anterior)  Precautions Comment: ANNA, anterior approach  , 2/26            Objective   Pain:  Pain Assessment  Pain Assessment: 0-10  0-10 (Numeric) Pain Score: 3  Pain Type: Surgical pain  Pain Location: Hip  Pain Orientation: Right  Pain Descriptors:  Burning  Pain Interventions: Cold applied  Cognition:  Cognition  Orientation Level: Oriented X4  Coordination:     Postural Control:     Extremity/Trunk Assessments:        Activity Tolerance:  Activity Tolerance  Endurance: Endurance does not limit participation in activity  Treatments:  Therapeutic Exercise  Therapeutic Exercise Performed: Yes (Supine AP, QS,GS, HS and abd. Seated LAQ x 15ea BLE. No QS on LLE pt states it bothers her meniscus injury.)    Ambulation/Gait Training  Ambulation/Gait Training Performed: Yes (Pt ambulating 200' with WW at supervision level. Pt demos a slow, reciprocal gait with fair step height/length.)  Transfers  Transfer: Yes (Sit<>stand from chair with WW at mod I level.)    Stairs  Stairs: Yes (Stair training 10 steps with R hand rail and quad cane with non reciprocal gait at A, PTA managing IV line.)    Outcome Measures:  [facility] Basic Mobility  Turning from your back to your side while in a flat bed without using bedrails: A little  Moving from lying on your back to sitting on the side of a flat bed without using bedrails: A little  Moving to and from bed to chair (including a wheelchair): A little  Standing up from a chair using your arms (e.g. wheelchair or bedside chair): None  To walk in hospital room: A little  Climbing 3-5 steps with railing: A little  Basic Mobility - Total Score: 19    Education Documentation  Handouts, taught by Tiana Barnes PTA at 3/2/2025 11:58 AM.  Learner: Patient  Readiness: Acceptance  Method: Explanation  Response: Verbalizes Understanding    Precautions, taught by Tiana Barnes PTA at 3/2/2025 11:58 AM.  Learner: Patient  Readiness: Acceptance  Method: Explanation  Response: Verbalizes Understanding    Home Exercise Program, taught by Tiana Barnes PTA at 3/2/2025 11:58 AM.  Learner: Patient  Readiness: Acceptance  Method: Explanation  Response: Verbalizes Understanding    Mobility Training, taught by Tiana Barnes PTA at 3/2/2025  11:58 AM.  Learner: Patient  Readiness: Acceptance  Method: Explanation  Response: Verbalizes Understanding    Education Comments  No comments found.        EDUCATION:  Outpatient Education  Individual(s) Educated: Patient  Education Provided: Body Mechanics, Home Exercise Program, Fall Risk, Post-Op Precautions    Encounter Problems       Encounter Problems (Active)       PT Problem       Patient will complete bed mobility indep without use of bed rails (Progressing)       Start:  02/27/25    Expected End:  03/13/25            Patient will complete functional transfers with wheeled walker MI (Met)       Start:  02/27/25    Expected End:  03/13/25    Resolved:  03/02/25         Patient will be indep with HEP for LE ROM and strengthening (Progressing)       Start:  02/27/25    Expected End:  03/13/25            Patient will ambulate 200' with wheeled walker and supervision (Met)       Start:  02/27/25    Expected End:  03/13/25    Resolved:  03/02/25             Patient will ascend/descend 12 stairs with on handrail and supervision (Progressing)       Start:  02/27/25    Expected End:  03/13/25                   Pain - Adult

## 2025-03-03 ENCOUNTER — HOME CARE VISIT (OUTPATIENT)
Dept: HOME HEALTH SERVICES | Facility: HOME HEALTH | Age: 55
End: 2025-03-03
Payer: COMMERCIAL

## 2025-03-03 VITALS
HEART RATE: 98 BPM | SYSTOLIC BLOOD PRESSURE: 113 MMHG | OXYGEN SATURATION: 95 % | TEMPERATURE: 98.3 F | DIASTOLIC BLOOD PRESSURE: 82 MMHG

## 2025-03-03 PROCEDURE — G0152 HHCP-SERV OF OT,EA 15 MIN: HCPCS

## 2025-03-03 PROCEDURE — G0151 HHCP-SERV OF PT,EA 15 MIN: HCPCS

## 2025-03-03 ASSESSMENT — ENCOUNTER SYMPTOMS
LOWEST PAIN SEVERITY IN PAST 24 HOURS: 0/10
PERSON REPORTING PAIN: PATIENT
PERSON REPORTING PAIN: PATIENT
PAIN LOCATION - PAIN QUALITY: ACHE, BURNING
PAIN: 1
SUBJECTIVE PAIN PROGRESSION: GRADUALLY IMPROVING
PAIN: 1
PAIN SEVERITY GOAL: 0/10
DEPRESSION: 0
LOSS OF SENSATION IN FEET: 0
HIGHEST PAIN SEVERITY IN PAST 24 HOURS: 7/10
SUBJECTIVE PAIN PROGRESSION: GRADUALLY IMPROVING
PAIN LOCATION - PAIN FREQUENCY: CONSTANT
PAIN LOCATION - PAIN SEVERITY: 5/10
PAIN LOCATION - PAIN SEVERITY: 2/10
PAIN LOCATION: RIGHT HIP
PAIN LOCATION: RIGHT HIP
HIGHEST PAIN SEVERITY IN PAST 24 HOURS: 7/10
LOWEST PAIN SEVERITY IN PAST 24 HOURS: 2/10
OCCASIONAL FEELINGS OF UNSTEADINESS: 0

## 2025-03-03 ASSESSMENT — ACTIVITIES OF DAILY LIVING (ADL)
ENTERING_EXITING_HOME: NEEDS ASSISTANCE
FEEDING_WITHIN_DEFINED_LIMITS: 1
BATHING_WITHIN_DEFINED_LIMITS: 1
BATHING ASSESSED: 1
PHYSICAL_TRANSFERS_DEVICES: WW
CURRENT_FUNCTION: STAND BY ASSIST
BATHING EQUIPMENT USED: SHOWER CHAIR
GROOMING ASSESSED: 1
GROOMING_WITHIN_DEFINED_LIMITS: 1
OASIS_M1830: 03
DRESSING_UB_CURRENT_FUNCTION: INDEPENDENT
TOILETING: 1
DRESSING_LB_CURRENT_FUNCTION: SUPERVISION
AMBULATION ASSISTANCE: STAND BY ASSIST
AMBULATION ASSISTANCE ON FLAT SURFACES: 1
AMBULATION_DISTANCE/DURATION_TOLERATED: 50'
PREPARING MEALS: NEEDS ASSISTANCE
AMBULATION ASSISTANCE: 1
PHYSICAL TRANSFERS ASSESSED: 1
BATHING_CURRENT_FUNCTION: SUPERVISION
TOILETING: SUPERVISION
GROOMING_CURRENT_FUNCTION: SUPERVISION

## 2025-03-05 ENCOUNTER — HOME CARE VISIT (OUTPATIENT)
Dept: HOME HEALTH SERVICES | Facility: HOME HEALTH | Age: 55
End: 2025-03-05
Payer: COMMERCIAL

## 2025-03-05 VITALS
HEART RATE: 74 BPM | SYSTOLIC BLOOD PRESSURE: 128 MMHG | TEMPERATURE: 98.8 F | OXYGEN SATURATION: 99 % | DIASTOLIC BLOOD PRESSURE: 72 MMHG

## 2025-03-05 PROCEDURE — G0157 HHC PT ASSISTANT EA 15: HCPCS | Mod: CQ

## 2025-03-05 PROCEDURE — G0152 HHCP-SERV OF OT,EA 15 MIN: HCPCS

## 2025-03-05 SDOH — HEALTH STABILITY: PHYSICAL HEALTH
EXERCISE COMMENTS: COMPLETED SUPINE AP, QS, GS, SAQ, HAMSTRING SETS, HEELSLIDES, REINFORCEMENT FOR ALIGNMENT, PACING, BREATHING OUT WITH EXERTION AND EXERCISING AS TOLERATED USING LEG LIFTER FOR ACTIVE ASSISTANCE 15 REPS.

## 2025-03-05 ASSESSMENT — ENCOUNTER SYMPTOMS
PERSON REPORTING PAIN: PATIENT
PAIN LOCATION: RIGHT HIP
HIGHEST PAIN SEVERITY IN PAST 24 HOURS: 8/10
PAIN: 1
PERSON REPORTING PAIN: PATIENT
PAIN LOCATION: RIGHT HIP
PAIN: 1
PAIN LOCATION - PAIN SEVERITY: 5/10
PAIN LOCATION - PAIN FREQUENCY: CONSTANT
LOWEST PAIN SEVERITY IN PAST 24 HOURS: 3/10
PAIN LOCATION - PAIN QUALITY: ACHE
PAIN SEVERITY GOAL: 0/10
PAIN LOCATION - RELIEVING FACTORS: ICE, MEDS
HIGHEST PAIN SEVERITY IN PAST 24 HOURS: 6/10
PAIN LOCATION - PAIN SEVERITY: 3/10
SUBJECTIVE PAIN PROGRESSION: GRADUALLY IMPROVING
PAIN LOCATION - EXACERBATING FACTORS: GETTING OUT OF BED

## 2025-03-05 ASSESSMENT — ACTIVITIES OF DAILY LIVING (ADL)
CURRENT_FUNCTION: INDEPENDENT
DRESSING_LB_CURRENT_FUNCTION: INDEPENDENT
PREPARING MEALS: INDEPENDENT
PHYSICAL_TRANSFERS_DEVICES: WW
PHYSICAL TRANSFERS ASSESSED: 1
TOILETING: 1
AMBULATION ASSISTANCE: 1
AMBULATION ASSISTANCE: INDEPENDENT
DRESSING_UB_CURRENT_FUNCTION: INDEPENDENT
TOILETING: INDEPENDENT

## 2025-03-10 ENCOUNTER — HOME CARE VISIT (OUTPATIENT)
Dept: HOME HEALTH SERVICES | Facility: HOME HEALTH | Age: 55
End: 2025-03-10
Payer: COMMERCIAL

## 2025-03-10 VITALS
SYSTOLIC BLOOD PRESSURE: 118 MMHG | TEMPERATURE: 98.5 F | DIASTOLIC BLOOD PRESSURE: 80 MMHG | OXYGEN SATURATION: 98 % | HEART RATE: 80 BPM

## 2025-03-10 DIAGNOSIS — Z96.641 S/P TOTAL RIGHT HIP ARTHROPLASTY: Primary | ICD-10-CM

## 2025-03-10 PROCEDURE — G0157 HHC PT ASSISTANT EA 15: HCPCS | Mod: CQ

## 2025-03-10 ASSESSMENT — ENCOUNTER SYMPTOMS
PERSON REPORTING PAIN: PATIENT
PAIN LOCATION: RIGHT HIP
HIGHEST PAIN SEVERITY IN PAST 24 HOURS: 6/10
PAIN: 1
PAIN LOCATION - PAIN SEVERITY: 4/10

## 2025-03-11 SDOH — HEALTH STABILITY: PHYSICAL HEALTH
EXERCISE COMMENTS: STRENGTH TRAINING WITH INSTRUCTIONS/REINFORCEMENT FOR ALIGNMENT AND TECHNIQUE SUPINE AP, QS, GS, HAMSTRING SETS, HEEL SLIDES, SAQ 15X.

## 2025-03-13 ENCOUNTER — HOME CARE VISIT (OUTPATIENT)
Dept: HOME HEALTH SERVICES | Facility: HOME HEALTH | Age: 55
End: 2025-03-13
Payer: COMMERCIAL

## 2025-03-13 VITALS
DIASTOLIC BLOOD PRESSURE: 64 MMHG | SYSTOLIC BLOOD PRESSURE: 104 MMHG | TEMPERATURE: 97.7 F | OXYGEN SATURATION: 96 % | HEART RATE: 75 BPM

## 2025-03-13 PROCEDURE — G0157 HHC PT ASSISTANT EA 15: HCPCS | Mod: CQ

## 2025-03-13 SDOH — HEALTH STABILITY: PHYSICAL HEALTH
EXERCISE COMMENTS: COMPLETED SUPINE AP, QS, GS, HAMSTRING SETS, SAQ, HEEL SLIDES AND STANDING HEEL RAISE, MARCHES, MINI-SQUATS AND ALTERNATING KNEE FLEXION 20X WITH REINFORCEMENT FOR ALIGNMENT AND TECHNIQUE, BREATHING OUT WITH EXERTION.

## 2025-03-13 ASSESSMENT — ENCOUNTER SYMPTOMS
PAIN: 1
PERSON REPORTING PAIN: PATIENT
PAIN LOCATION: RIGHT HIP
HIGHEST PAIN SEVERITY IN PAST 24 HOURS: 4/10
PAIN LOCATION - PAIN SEVERITY: 3/10

## 2025-03-17 ENCOUNTER — HOME CARE VISIT (OUTPATIENT)
Dept: HOME HEALTH SERVICES | Facility: HOME HEALTH | Age: 55
End: 2025-03-17
Payer: COMMERCIAL

## 2025-03-17 PROCEDURE — G0151 HHCP-SERV OF PT,EA 15 MIN: HCPCS

## 2025-03-17 ASSESSMENT — ENCOUNTER SYMPTOMS
LOWEST PAIN SEVERITY IN PAST 24 HOURS: 0/10
PAIN LOCATION: RIGHT HIP
SUBJECTIVE PAIN PROGRESSION: GRADUALLY IMPROVING
PERSON REPORTING PAIN: PATIENT
HIGHEST PAIN SEVERITY IN PAST 24 HOURS: 3/10
PAIN LOCATION - PAIN SEVERITY: 3/10
PAIN: 1

## 2025-03-17 ASSESSMENT — ACTIVITIES OF DAILY LIVING (ADL)
OASIS_M1830: 00
HOME_HEALTH_OASIS: 00

## 2025-03-21 ENCOUNTER — EVALUATION (OUTPATIENT)
Dept: PHYSICAL THERAPY | Facility: HOSPITAL | Age: 55
End: 2025-03-21
Payer: COMMERCIAL

## 2025-03-21 DIAGNOSIS — R29.898 WEAKNESS OF RIGHT LOWER EXTREMITY: Primary | ICD-10-CM

## 2025-03-21 DIAGNOSIS — M25.551 PAIN OF RIGHT HIP: ICD-10-CM

## 2025-03-21 PROCEDURE — 97161 PT EVAL LOW COMPLEX 20 MIN: CPT | Mod: GP | Performed by: PHYSICAL THERAPIST

## 2025-03-21 PROCEDURE — 97110 THERAPEUTIC EXERCISES: CPT | Mod: GP | Performed by: PHYSICAL THERAPIST

## 2025-03-21 ASSESSMENT — PAIN - FUNCTIONAL ASSESSMENT: PAIN_FUNCTIONAL_ASSESSMENT: 0-10

## 2025-03-21 ASSESSMENT — PAIN SCALES - GENERAL: PAINLEVEL_OUTOF10: 2

## 2025-03-21 NOTE — PROGRESS NOTES
Physical Therapy    Physical Therapy Evaluation and Treatment      Patient Name: Cindy Deluna  MRN: [id number]  Today's Date: 3/21/2025    Time Entry:   Time Calculation  Start Time: 0835  Stop Time: 0915  Time Calculation (min): 40 min  PT Evaluation Time Entry  PT Evaluation (Low) Time Entry: 20  PT Therapeutic Procedures Time Entry  Therapeutic Exercise Time Entry: 25                   Assessment:  This 55 yo pleasant female is s/p Rt THR, anterior, on 2/26/25.  She had 2 blood clots, PE and stayed 4 days in [facility].  She just finished with his home health.  She suffered Lt knee meniscal tear in knee and wearing a hinged knee brace in Feb 2025.  She's walking into the clinic this morning with her st cane and mild antalgia.  She has some limited Rt knee and hip AROM, decreased strength, balance, gait and overall function.  Difficulty with her basic and instrumental ADL's and work activities around her home.  She's sleeping better but needs to be inclined.  Mild Rt LE edema.  No calf tenderness.  Pt given HEP and all questions answered.        Plan:  Continue with skilled PT      Current Problem:   1. Weakness of right lower extremity  Follow Up In Physical Therapy      2. Pain of right hip  Follow Up In Physical Therapy          Subjective  Pt reports her Rt hip is feeling ok.    Pain:  Pain Assessment  Pain Assessment: 0-10  0-10 (Numeric) Pain Score: 2  Pain Type: Surgical pain, Chronic pain  Pain Location: Hip  Pain Orientation: Right    Objective   AROM:  Rt hip flexion to 40 and abduction to 30.  Pain with flexion    Rt knee AROM: 0 to 90 degrees of flexion      Strength:  Rt hip flexion 3/5, abduction 3+/5  Rt knee 4-4-/5 grossly throughout      Posture:  Mild Rt LE edema      Palpation:  Tenderness to proximal and lateral thigh around healed incision      Functional Mobility:  Independent with her transfers      Balance:  Good standing and fair walking balance      Special Tests:  Negative  Pilar's      Outcome Measures:  Other Measures  Lower Extremity Funtional Score (LEFS): 29/80     Treatments:    SLR's, flexion and abduction, assisted, 10 reps x 2 *  LAQ's, 30 reps *  NuStep, L3, 10 minutes    Goals:     0/10 Rt hip pain.  Rt hip/knee AROM WFL's within her hip precautions.  Rt hip/ knee/LE 5/5 strength grossly throughout.  Minimal antalgia to normal gait pattern with least restrictive device.  Pt walk up and down a flight of stairs with 1 rail with reciprocal gait pattern.  No difficulty or pain performing basic and instrumental ADL's and work activities around the house.  Independent with HEP.

## 2025-03-24 ENCOUNTER — TREATMENT (OUTPATIENT)
Dept: PHYSICAL THERAPY | Facility: HOSPITAL | Age: 55
End: 2025-03-24
Payer: COMMERCIAL

## 2025-03-24 DIAGNOSIS — R29.898 WEAKNESS OF RIGHT LOWER EXTREMITY: ICD-10-CM

## 2025-03-24 DIAGNOSIS — M25.551 PAIN OF RIGHT HIP: ICD-10-CM

## 2025-03-24 PROCEDURE — 97110 THERAPEUTIC EXERCISES: CPT | Mod: GP | Performed by: PHYSICAL THERAPIST

## 2025-03-24 ASSESSMENT — PAIN - FUNCTIONAL ASSESSMENT: PAIN_FUNCTIONAL_ASSESSMENT: 0-10

## 2025-03-24 ASSESSMENT — PAIN SCALES - GENERAL: PAINLEVEL_OUTOF10: 3

## 2025-03-24 NOTE — PROGRESS NOTES
"Physical Therapy    Physical Therapy Treatment    Patient Name: Cindy Deluna  MRN: [id number]  Today's Date: 3/24/2025    Time Entry:   Time Calculation  Start Time: 0800  Stop Time: 0843  Time Calculation (min): 43 min     PT Therapeutic Procedures Time Entry  Therapeutic Exercise Time Entry: 43                   Assessment:  Pt walking into the clinic with mild antalgia with her st cane.  Increased Rt knee flexion noted today.  She requires minimal assistance with her Rt supine SLR into flexion.  Initiated sciatic flossing, heel slides, 4\" F/L step ups and single leg stands.  No increased pain after treatment.          Plan:  Continue with skilled PT      Current Problem  1. Weakness of right lower extremity  Follow Up In Physical Therapy      2. Pain of right hip  Follow Up In Physical Therapy          Subjective  Pt reports some deep anterior hip pain and also states her lower back and sciatica on her Rt side has flared up.      Precautions  Precautions  Post-Surgical Precautions: Right hip precautions  Precautions Comment: anterior approach    Pain  Pain Assessment  Pain Assessment: 0-10  0-10 (Numeric) Pain Score: 3  Pain Type: Surgical pain, Chronic pain  Pain Location: Hip  Pain Orientation: Right    Objective   AROM:  Rt hip flexion to 44 and abduction to 30.  Pain with flexion     Rt knee AROM: 0 to 110  degrees of flexion      Treatments:    SLR's, flexion and abduction, assisted, 10 reps x 3, assisted *  LAQ's, 30 reps *  NuStep, L3, 10 minutes  TR/HR's, 30 reps  Standing slow marching in place, 30 reps  Standing HSC, 30 reps   Partial squats, 15 reps x 2  Sciatic flossing, 30 reps  Heel slides, 30 reps  4\" F/L lateral step ups, 10 reps x 2  SLS, 5 second hold, 10 reps         "

## 2025-03-24 NOTE — PROGRESS NOTES
No chief complaint on file.      The patient is here for follow-up of their side: right hip arthroplasty.  The patient has mild hip pain.  The patient has no mechanical symptoms.  The patient is approximately 3 weeks postop.    Physical examination:    Examination of the side: right hip  The incision is healing well  No erythema or warmth.  Range of motion: Forward Flexion: 120 Internal Rotation: 20 External Rotation: 30 Abduction 30  The Patient can single leg stand and actively abduct  There is some numbness along the lateral femoral cutaneous nerve distribution  Calf is soft, Homans negative  The patient has intact ankle dorsiflexion and plantarflexion.    Radiographs:  CT angio lower extremity right w and or wo IV contrast  Narrative: Interpreted By:  Kanu Dallas,   STUDY:  CT ANGIO LOWER EXTREMITY RIGHT W AND OR WO IV CONTRAST;  2/28/2025  5:39 pm      INDICATION:  Signs/Symptoms:bleeding. possible DVT..      COMPARISON:  None.      ACCESSION NUMBER(S):  [id number]      ORDERING CLINICIAN:  AFUA DELGADO      TECHNIQUE:  High-resolution contrast-enhanced helical CT of the abdomen, pelvis  and both lower extremities was performed, timed to the arterial  phase.  3-D processing was performed on an independent work station,  with MIP and volume-rendering techniques. Total of 100 ML of  Omnipaque 350 was injected intravenously during the examination.  The  study was performed without oral contrast. The patient tolerated the  injection without complications.      FINDINGS:  VASCULAR:      Images of the distal, infrarenal abdominal aorta demonstrate  scattered atherosclerotic change without significant focal stenosis  or aneurysm. focal stenosis.      Right common iliac artery  is widely patent with no significant  stenosis. Right external iliac artery  is widely patent with no  significant stenosis. Right internal iliac artery  is widely patent  with no significant stenosis.      Left common iliac artery   is widely patent with no significant  stenosis.      Right common femoral artery  is widely patent with no significant  stenosis. Right profunda femoris artery  is widely patent with no  significant stenosis. Right superficial femoral artery  is widely  patent with no significant stenosis.          NONVASCULAR FINDINGS:      The right total hip arthroplasty is grossly well aligned. No fracture  is identified. Expected postsurgical changes in the soft tissues of  the right leg with subcutaneous and intramuscular emphysema as well  as a mild-to-moderate amount of soft tissue stranding. The presence  of the hip arthroplasty results in artifact reducing sensitivity,  however, no definite site of contrast extravasation is identified in  the right thigh. There is no large intramuscular hematoma or fluid  collection.      On delayed, repeat images there is some opacification of the veins of  the leg. A direct thrombus is not identified, however, CT angiography  is suboptimal for evaluation of DVT.      Included portions of the right pelvis show loops of non dilated  bowel. There is a normal appendix. There is no intra-abdominal fluid  collection or free air in the included images. There is contrast  within the urinary bladder from a previous study.      Impression: 1. Within limitations above, no evidence of active contrast  extravasation, large intramuscular hematoma or fluid collection.  There are expected postsurgical changes in the right leg with  stranding and subcutaneous and intramuscular emphysema.  2. The right hip arthroplasty is well aligned. There is no fracture.  3. The vasculature included in the study is widely patent. There is  no aneurysm or significant stenosis.  4. There is no direct evidence of a DVT, however, CTA angiography is  insensitive for venous thrombosis. If there is concern for DVT  recommend venous ultrasound for definitive evaluation.      Signed by: Kanu Dallas 2/28/2025 5:56  PM  Dictation workstation:   [id number]  CT angio chest for pulmonary embolism  Narrative: Interpreted By:  Matthew Tan,   STUDY:  CT ANGIO CHEST FOR PULMONARY EMBOLISM;  2/28/2025 10:49 am      INDICATION:  55 y/o   F with  Signs/Symptoms:SOB with excertion..      COMPARISON:  None available.      ACCESSION NUMBER(S):  [id number]      ORDERING CLINICIAN:  AFUA DELGADO      TECHNIQUE:  CT images of the chest obtained following the administration of  75  ml of IV contrast (Omnipaque 350) in the pulmonary arterial phase of  contrast. Axial and coronal MIP images, as well as coronal sagittal  reformatted images generated and reviewed.      FINDINGS:  LIMITATIONS/LINES:   None.      PULMONARY ARTERIES:   Filling defect in segmental branch with  extension to subsegmental branches in lateral segment of right middle  lobe. Filling defect also observed in subsequent branch to medial  basal segment of right lower lobe. No additional filling defects  identified      HEART:   Heart is within normal limits of size. No significant  pericardial effusion.      MEDIASTINUM/JESSY:   Unremarkable.      PLEURA:   Unremarkable.      LUNG PARENCHYMA:   Unremarkable.      BONES:   Unremarkable.      CHEST WALL/OTHER:   Unremarkable.      Impression: Pulmonary emboli segmental branch right middle lobe and subsegmental  branch right lower lobe.      MACRO:  Matthew Tan discussed the significance and urgency of this critical  finding by telephone with  AFUA DELGADO on 2/28/2025 at 11:13 am.  (**-RCF-**) Findings:  Acute pulmonary embolus.      None      Signed by: Matthew Tan 2/28/2025 11:16 AM  Dictation workstation:   [id number]        Impression:  Status post side: right total hip arthroplasty    Plan:  Eliquis per her primary care for the pulmonary embolism  Observe the lateral femoral cutaneous nerve  Outpatient physical therapy  Follow up in  9 weeks for recheck and x-rays  All questions answered

## 2025-03-25 ENCOUNTER — HOSPITAL ENCOUNTER (OUTPATIENT)
Dept: RADIOLOGY | Facility: HOSPITAL | Age: 55
Discharge: HOME | End: 2025-03-25
Payer: COMMERCIAL

## 2025-03-25 ENCOUNTER — APPOINTMENT (OUTPATIENT)
Dept: ORTHOPEDIC SURGERY | Facility: CLINIC | Age: 55
End: 2025-03-25
Payer: COMMERCIAL

## 2025-03-25 DIAGNOSIS — Z96.641 STATUS POST RIGHT HIP REPLACEMENT: Primary | ICD-10-CM

## 2025-03-25 DIAGNOSIS — Z96.641 S/P TOTAL RIGHT HIP ARTHROPLASTY: ICD-10-CM

## 2025-03-25 PROCEDURE — 99024 POSTOP FOLLOW-UP VISIT: CPT | Performed by: ORTHOPAEDIC SURGERY

## 2025-03-25 PROCEDURE — 73502 X-RAY EXAM HIP UNI 2-3 VIEWS: CPT | Mod: RT

## 2025-03-25 PROCEDURE — 73502 X-RAY EXAM HIP UNI 2-3 VIEWS: CPT | Mod: RIGHT SIDE | Performed by: STUDENT IN AN ORGANIZED HEALTH CARE EDUCATION/TRAINING PROGRAM

## 2025-03-26 ENCOUNTER — TREATMENT (OUTPATIENT)
Dept: PHYSICAL THERAPY | Facility: HOSPITAL | Age: 55
End: 2025-03-26
Payer: COMMERCIAL

## 2025-03-26 DIAGNOSIS — R29.898 WEAKNESS OF RIGHT LOWER EXTREMITY: Primary | ICD-10-CM

## 2025-03-26 DIAGNOSIS — M25.551 PAIN OF RIGHT HIP: ICD-10-CM

## 2025-03-26 PROCEDURE — 97110 THERAPEUTIC EXERCISES: CPT | Mod: GP | Performed by: PHYSICAL THERAPIST

## 2025-03-26 ASSESSMENT — PAIN SCALES - GENERAL: PAINLEVEL_OUTOF10: 3

## 2025-03-26 ASSESSMENT — PAIN - FUNCTIONAL ASSESSMENT: PAIN_FUNCTIONAL_ASSESSMENT: 0-10

## 2025-03-26 NOTE — PROGRESS NOTES
"Physical Therapy    Physical Therapy Treatment    Patient Name: Cindy Deluna  MRN: [id number]  Today's Date: 3/26/2025    Time Entry:   Time Calculation  Start Time: 0540  Stop Time: 0623  Time Calculation (min): 43 min     PT Therapeutic Procedures Time Entry  Therapeutic Exercise Time Entry: 43                   Assessment:  Pt walking into the clinic today with her st cane and Lt knee brace.  She's able to do her supine and side lying SLR's with less assistance today.  She was able to do 4\" steps easier today.  Added 2lb weight with her LAQ's and standing marching and HSC with good tolerance today.  She's going to try driving this weekend around her neighborhood.  No change in pain after treatment.          Plan:  Continue skilled PT as needed.       Current Problem  1. Weakness of right lower extremity  Follow Up In Physical Therapy      2. Pain of right hip  Follow Up In Physical Therapy        Subjective  Pt states her Rt hip is feeling ok and her Lt knee isn't feeling too bad today.      Precautions  Precautions  Post-Surgical Precautions: Right hip precautions  Precautions Comment: anterior approach    Pain  Pain Assessment  Pain Assessment: 0-10  0-10 (Numeric) Pain Score: 3  Pain Type: Surgical pain, Chronic pain  Pain Location: Hip  Pain Orientation: Right    Objective     AROM:  Rt hip flexion to 55 and abduction to 30.  Pain with flexion     Rt knee AROM: 0 to 120  degrees of flexion    Treatments:    SLR's, flexion and abduction, assisted, 15, 10 , 5 reps x 3, minimal assistance *  LAQ's, 2 lbs, 30 reps *  NuStep, L3, 10 minutes  TR/HR's, 30 reps, increased Lt knee pain with 1/2 roll  Standing slow marching in place, 2 lbs, 30 reps  Standing HSC, 2 lbs, 30 reps   Partial squats, 15 reps x 2  Sciatic flossing, 30 reps  Heel slides, 30 reps  4\" F/L lateral step ups, 20 reps   SLS, 5 second hold, 10 reps       "

## 2025-03-28 ENCOUNTER — APPOINTMENT (OUTPATIENT)
Dept: ORTHOPEDIC SURGERY | Facility: CLINIC | Age: 55
End: 2025-03-28
Payer: COMMERCIAL

## 2025-04-01 ENCOUNTER — APPOINTMENT (OUTPATIENT)
Dept: PHYSICAL THERAPY | Facility: HOSPITAL | Age: 55
End: 2025-04-01
Payer: COMMERCIAL

## 2025-04-03 ENCOUNTER — TREATMENT (OUTPATIENT)
Dept: PHYSICAL THERAPY | Facility: HOSPITAL | Age: 55
End: 2025-04-03
Payer: COMMERCIAL

## 2025-04-03 DIAGNOSIS — R29.898 WEAKNESS OF RIGHT LOWER EXTREMITY: ICD-10-CM

## 2025-04-03 DIAGNOSIS — M25.551 PAIN OF RIGHT HIP: ICD-10-CM

## 2025-04-03 PROCEDURE — 97110 THERAPEUTIC EXERCISES: CPT | Mod: GP | Performed by: PHYSICAL THERAPIST

## 2025-04-03 ASSESSMENT — PAIN - FUNCTIONAL ASSESSMENT: PAIN_FUNCTIONAL_ASSESSMENT: 0-10

## 2025-04-03 ASSESSMENT — PAIN SCALES - GENERAL: PAINLEVEL_OUTOF10: 2

## 2025-04-03 NOTE — PROGRESS NOTES
"Physical Therapy    Physical Therapy Treatment    Patient Name: Cindy Deluna  MRN: [id number]  Today's Date: 4/3/2025    Time Entry:   Time Calculation  Start Time: 0130  Stop Time: 0213  Time Calculation (min): 43 min     PT Therapeutic Procedures Time Entry  Therapeutic Exercise Time Entry: 43                   Assessment:  Pt walking into the clinic with her st cane with minimal antalgia.  Held SLR's secondary increased LBP today.  Increased step height to 6\" which she tolerated fairly well.  Initiated walking stairs reciprocally with 1 rail and her st cane which increased her Rt leg symptoms.  She will walk them non reciprocally for now.       Plan:  Continue with skilled PT    Current Problem  1. Weakness of right lower extremity  Follow Up In Physical Therapy      2. Pain of right hip  Follow Up In Physical Therapy            Subjective  Pt reports aggravating her lower back over the weekend.    Precautions  Precautions  Post-Surgical Precautions: Right hip precautions  Precautions Comment: anterior approach    Pain  Pain Assessment  Pain Assessment: 0-10  0-10 (Numeric) Pain Score: 2  Pain Type: Chronic pain, Surgical pain  Pain Location: Hip  Pain Orientation: Right    Objective     AROM:  Rt hip flexion to 55 and abduction to 30.  Pain with flexion     Rt knee AROM: 0 to 120  degrees of flexion    Treatments:    SLR's, flexion and abduction, assisted, 15, 10 , 5 reps x 3, minimal assistance * - NP  LAQ's, 2 lbs, 30 reps *  NuStep, L3, 10 minutes  TR/HR's, 30 reps, increased Lt knee pain with 1/2 roll  Standing slow marching in place, 2 lbs, 30 reps  Standing HSC, 2 lbs, 30 reps   Partial squats, 15 reps x 2  Sciatic flossing, 30 reps  Heel slides, 30 reps  6\" F/L lateral step ups, 20 reps   SLS, 5 second hold, 10 reps   Stairs with Lt rail,  reciprocally, 5 reps          Goals:    "

## 2025-04-08 ENCOUNTER — TREATMENT (OUTPATIENT)
Dept: PHYSICAL THERAPY | Facility: HOSPITAL | Age: 55
End: 2025-04-08
Payer: COMMERCIAL

## 2025-04-08 DIAGNOSIS — R29.898 WEAKNESS OF RIGHT LOWER EXTREMITY: Primary | ICD-10-CM

## 2025-04-08 DIAGNOSIS — M25.551 PAIN OF RIGHT HIP: ICD-10-CM

## 2025-04-08 PROCEDURE — 97110 THERAPEUTIC EXERCISES: CPT | Mod: GP | Performed by: PHYSICAL THERAPIST

## 2025-04-08 ASSESSMENT — PAIN - FUNCTIONAL ASSESSMENT: PAIN_FUNCTIONAL_ASSESSMENT: 0-10

## 2025-04-08 ASSESSMENT — PAIN SCALES - GENERAL: PAINLEVEL_OUTOF10: 2

## 2025-04-08 NOTE — PROGRESS NOTES
Physical Therapy    Physical Therapy Treatment    Patient Name: Cindy Deluna  MRN: [id number]  Today's Date: 4/8/2025    Time Entry:   Time Calculation  Start Time: 0315  Stop Time: 0350  Time Calculation (min): 35 min     PT Therapeutic Procedures Time Entry  Therapeutic Exercise Time Entry: 30                   Assessment:  She's walking into the clinic with her st cane with minimal antalgia.  She's wearing her Lt knee brace.  Her Rt knee AROM is WFL's and relatively pain free.  Mild tenderness to lateral joint line.  She's OK to lay on her Rt side if she tolerates this at night.  Held some ex's today secondary to knee pain.  She's going to ice her knees and if feeling better Thursday increase the exercise.  She's going to walk with st cane 10 minutes/day if she tolerates the increased loading.          Plan:  Continue skilled PT as needed.      Current Problem  1. Weakness of right lower extremity  Follow Up In Physical Therapy      2. Pain of right hip  Follow Up In Physical Therapy          Subjective  Pt reports her Rt knee buckling over the weekend when stepping down a step into the family room and she fell backwards and sat down.  She had some Rt knee pain during Rt lateral step ups last week.  She reports her both her knees are sore as well as her hip and her lower back feels unstable.      Precautions  Precautions  Post-Surgical Precautions: Right hip precautions  Precautions Comment: anterior approach    Pain  Pain Assessment  Pain Assessment: 0-10  0-10 (Numeric) Pain Score: 2  Pain Type: Chronic pain, Surgical pain  Pain Location: Hip  Pain Orientation: Right    Objective     AROM:  Rt hip flexion to 55 and abduction to 30.  Pain with flexion     Rt knee AROM: 0 to 120  degrees of flexion    Treatments:    SLR's, flexion and abduction, minimal assistance, 15 reps x 2 *  LAQ's, 2 lbs, 30 reps * - NP  NuStep, L4, 10 minutes  HR's, 30 reps   Standing slow marching in place, 2 lbs, 30 reps  Standing  "HSC, 2 lbs, 30 reps   Partial squats, 15 reps x 2  Sciatic flossing, 30 reps  Heel slides, 30 reps  6\" F/L lateral step ups, 20 reps - Held  SLS, 5 second hold, 10 reps   Stairs with Lt rail,  reciprocally, 5 reps - Held          "

## 2025-04-10 ENCOUNTER — TREATMENT (OUTPATIENT)
Dept: PHYSICAL THERAPY | Facility: HOSPITAL | Age: 55
End: 2025-04-10
Payer: COMMERCIAL

## 2025-04-10 DIAGNOSIS — M25.551 PAIN OF RIGHT HIP: ICD-10-CM

## 2025-04-10 DIAGNOSIS — R29.898 WEAKNESS OF RIGHT LOWER EXTREMITY: ICD-10-CM

## 2025-04-10 PROCEDURE — 97110 THERAPEUTIC EXERCISES: CPT | Mod: GP | Performed by: PHYSICAL THERAPIST

## 2025-04-10 ASSESSMENT — PAIN SCALES - GENERAL: PAINLEVEL_OUTOF10: 2

## 2025-04-10 ASSESSMENT — PAIN - FUNCTIONAL ASSESSMENT: PAIN_FUNCTIONAL_ASSESSMENT: 0-10

## 2025-04-10 NOTE — PROGRESS NOTES
"Physical Therapy    Physical Therapy Treatment    Patient Name: Cindy Deluna  MRN: [id number]  Today's Date: 4/10/2025    Time Entry:   Time Calculation  Start Time: 0300  Stop Time: 0343  Time Calculation (min): 43 min     PT Therapeutic Procedures Time Entry  Therapeutic Exercise Time Entry: 43                   Assessment:  Pt walking into the clinic with her st cane with minimal antalgia today.   Knee brace around her Lt knee.  She continues with feelings of instability in her Rt knee.  She was able to perform step ups today and walking stairs reciprocally.          Plan:  Continue with skilled PT      Current Problem  1. Weakness of right lower extremity  Follow Up In Physical Therapy      2. Pain of right hip  Follow Up In Physical Therapy          Subjective  Pt reports her hip is a sore in her groin and her knees feel about the same.  No LBP.      Precautions  Precautions  Post-Surgical Precautions: Right hip precautions  Precautions Comment: anterior approach    Pain  Pain Assessment  Pain Assessment: 0-10  0-10 (Numeric) Pain Score: 2  Pain Type: Chronic pain, Surgical pain  Pain Location: Hip  Pain Orientation: Right    Objective     AROM:  Rt hip flexion to 55 and abduction to 30.  Pain with flexion     Rt knee AROM: 0 to 120  degrees of flexion       Treatments:      SLR's, flexion and abduction, minimal assistance, 15 reps x 2 *  LAQ's, 2 lbs, 30 reps * - NP  NuStep, L4, seat 9, 10 minutes  HR/TR's,  30 reps   Standing slow marching in place, 2 lbs, 30 reps  Standing HSC, 2 lbs, 30 reps   Partial squats, 15 reps x 2  Sciatic flossing, 30 reps  Heel slides, 30 reps  6\" F lateral step ups, 20 reps   SLS, 5 second hold, 15reps   Stairs with Lt rail,  reciprocally, 5 reps      "

## 2025-04-15 ENCOUNTER — TREATMENT (OUTPATIENT)
Dept: PHYSICAL THERAPY | Facility: HOSPITAL | Age: 55
End: 2025-04-15
Payer: COMMERCIAL

## 2025-04-15 DIAGNOSIS — R29.898 WEAKNESS OF RIGHT LOWER EXTREMITY: ICD-10-CM

## 2025-04-15 DIAGNOSIS — M25.551 PAIN OF RIGHT HIP: ICD-10-CM

## 2025-04-15 PROCEDURE — 97110 THERAPEUTIC EXERCISES: CPT | Mod: GP | Performed by: PHYSICAL THERAPIST

## 2025-04-15 ASSESSMENT — PAIN - FUNCTIONAL ASSESSMENT: PAIN_FUNCTIONAL_ASSESSMENT: 0-10

## 2025-04-15 ASSESSMENT — PAIN SCALES - GENERAL: PAINLEVEL_OUTOF10: 0 - NO PAIN

## 2025-04-15 NOTE — PROGRESS NOTES
"Physical Therapy    Physical Therapy Treatment    Patient Name: Cindy Deluna  MRN: [id number]  Today's Date: 4/15/2025    Time Entry:   Time Calculation  Start Time: 0130  Stop Time: 0213  Time Calculation (min): 43 min     PT Therapeutic Procedures Time Entry  Therapeutic Exercise Time Entry: 43                   Assessment:  Pt walking into the clinic this afternoon with her st cane with minimal antalgia.  Her strength and endurance are improving.  Decreased (B) knee pain and no episodes of her knee buckling.          Plan:  Continue with skilled PT      Current Problem  1. Weakness of right lower extremity  Follow Up In Physical Therapy      2. Pain of right hip  Follow Up In Physical Therapy          Subjective  Pt states her hip, knees and back are feeling better.     Precautions  Precautions  Post-Surgical Precautions: Right hip precautions  Precautions Comment: anterior approach    Pain  Pain Assessment  Pain Assessment: 0-10  0-10 (Numeric) Pain Score: 0 - No pain    Objective     AROM:  Rt hip flexion to 55 and abduction to 30.  Pain with flexion     Rt knee AROM: 0 to 120  degrees of flexion    Treatments:    SLR's, flexion and abduction, minimal assistance, 15 reps x 2 *  NuStep, L4, seat 9, 10 minutes  HR/TR's,  30 reps   Standing slow marching in place, 3 lbs, 30 reps  Standing HSC, 2 lbs, 30 reps   Partial squats, 15 reps x 2  Sciatic flossing, 30 reps  Heel slides, 30 reps  6\" F/L step ups, 30 reps   6\" step overs, 30 rep   SLS, 5-10 second hold, 20 reps   Standing hip 3-way, RTB, 20 reps *  Stairs with Lt rail,  reciprocally, 5 reps     "

## 2025-04-17 ENCOUNTER — APPOINTMENT (OUTPATIENT)
Dept: PHYSICAL THERAPY | Facility: HOSPITAL | Age: 55
End: 2025-04-17
Payer: COMMERCIAL

## 2025-04-22 ENCOUNTER — TREATMENT (OUTPATIENT)
Dept: PHYSICAL THERAPY | Facility: HOSPITAL | Age: 55
End: 2025-04-22
Payer: COMMERCIAL

## 2025-04-22 DIAGNOSIS — R29.898 WEAKNESS OF RIGHT LOWER EXTREMITY: ICD-10-CM

## 2025-04-22 DIAGNOSIS — M25.551 PAIN OF RIGHT HIP: ICD-10-CM

## 2025-04-22 PROCEDURE — 97110 THERAPEUTIC EXERCISES: CPT | Mod: GP | Performed by: PHYSICAL THERAPIST

## 2025-04-22 ASSESSMENT — PAIN SCALES - GENERAL: PAINLEVEL_OUTOF10: 0 - NO PAIN

## 2025-04-22 ASSESSMENT — PAIN - FUNCTIONAL ASSESSMENT: PAIN_FUNCTIONAL_ASSESSMENT: 0-10

## 2025-04-22 NOTE — PROGRESS NOTES
"Physical Therapy    Physical Therapy Treatment    Patient Name: Cindy Deluna  MRN: [id number]  Today's Date: 4/22/2025    Time Entry:   Time Calculation  Start Time: 0130  Stop Time: 0215  Time Calculation (min): 45 min     PT Therapeutic Procedures Time Entry  Therapeutic Exercise Time Entry: 45                   Assessment:  Pt walking into the clinic today with her st cane with minimal antalgia.  Tenderness and edema around her Rt lateral knee/joint line.  Her Rt hip strength and endurance are improving.  No increased pain after treatment.      Plan:  Continue with skilled PT      Current Problem  1. Weakness of right lower extremity  Follow Up In Physical Therapy      2. Pain of right hip  Follow Up In Physical Therapy            Subjective  Pt states her back went out last Thursday and over the weekend her Rt knee gave out walking stairs.      Precautions  Precautions  Post-Surgical Precautions: Right hip precautions  Precautions Comment: anterior    Pain  Pain Assessment  Pain Assessment: 0-10  0-10 (Numeric) Pain Score: 0 - No pain    Objective     AROM:  Rt hip flexion to 55 and abduction to 30.  Pain with flexion     Rt knee AROM: 0 to 120  degrees of flexion       Treatments:    SLR's, flexion and abduction, minimal assistance, 15 reps x 2 *  NuStep, L4, seat 9, 10 minutes  HR/TR's,  30 reps   Standing slow marching in place, 3 lbs, 30 reps  Standing HSC, 3 lbs, 30 reps   Partial squats, 15 reps x 2  Sciatic flossing, 30 reps  Heel slides, 30 reps  6\" F/L step ups, 30 reps   6\" step overs, 30 rep   SLS, 5-10 second hold, 20 reps   Standing hip 3-way, RTB, 30 reps *  Stairs with Lt rail,  reciprocally, 5 reps       "

## 2025-04-24 ENCOUNTER — APPOINTMENT (OUTPATIENT)
Dept: PHYSICAL THERAPY | Facility: HOSPITAL | Age: 55
End: 2025-04-24
Payer: COMMERCIAL

## 2025-04-29 ENCOUNTER — APPOINTMENT (OUTPATIENT)
Dept: PHYSICAL THERAPY | Facility: HOSPITAL | Age: 55
End: 2025-04-29
Payer: COMMERCIAL

## 2025-04-29 DIAGNOSIS — M25.551 PAIN OF RIGHT HIP: ICD-10-CM

## 2025-04-29 DIAGNOSIS — R29.898 WEAKNESS OF RIGHT LOWER EXTREMITY: ICD-10-CM

## 2025-04-29 PROCEDURE — 97110 THERAPEUTIC EXERCISES: CPT | Mod: GP | Performed by: PHYSICAL THERAPIST

## 2025-04-29 ASSESSMENT — PAIN SCALES - GENERAL: PAINLEVEL_OUTOF10: 3

## 2025-04-29 ASSESSMENT — PAIN - FUNCTIONAL ASSESSMENT: PAIN_FUNCTIONAL_ASSESSMENT: 0-10

## 2025-04-29 NOTE — PROGRESS NOTES
"Physical Therapy    Physical Therapy Treatment    Patient Name: Cindy Deluna  MRN: [id number]  Today's Date: 4/29/2025    Time Entry:   Time Calculation  Start Time: 1100  Stop Time: 1145  Time Calculation (min): 45 min     PT Therapeutic Procedures Time Entry  Therapeutic Exercise Time Entry: 43                   Assessment:  Pt walking into the clinic today with her st cane with mild antalgia secondary to increased Lt knee pain today.  Her hip is slowly progressing with her strength and endurance but her overall progress has plateaued some secondary to her (B) knee pain.  Will recheck her in a couple weeks.        Plan:  Recheck in a couple weeks       Current Problem  1. Weakness of right lower extremity  Follow Up In Physical Therapy      2. Pain of right hip  Follow Up In Physical Therapy          Subjective  Pt states her hip is feeling great but her Lt knee is sore.  She states her Rt knee feels somewhat similar to her Lt knee but not as involved.      Precautions  Precautions  Post-Surgical Precautions: Right hip precautions  Precautions Comment: anterior    Pain  Pain Assessment  Pain Assessment: 0-10  0-10 (Numeric) Pain Score: 3  Pain Type: Chronic pain  Pain Location: Knee  Pain Orientation: Left    Objective     AROM:  Rt hip flexion to 55 and abduction to 30.  Pain with flexion     Rt knee AROM: 0 to 120  degrees of flexion    Rt hip flexion and abduction 4-/5       Treatments:    SLR's, flexion and abduction, minimal assistance, 15 reps x 2 *  NuStep, L5, seat 9, 10 minutes  HR's,  30 reps   Standing slow marching in place, 4 lbs, 30 reps  Standing HSC, 4 lbs, 30 reps   Partial squats, 15 reps x 2  Sciatic flossing, 30 reps  8\" F/L step ups, 20 reps   8\6\" step overs, 20 rep   SLS, 5-10 second hold, 20 reps   Standing hip 3-way, RTB, 30 reps *  Stairs with Lt rail,  reciprocally, 10 reps          "

## 2025-05-12 DIAGNOSIS — Z96.641 STATUS POST RIGHT HIP REPLACEMENT: Primary | ICD-10-CM

## 2025-05-12 NOTE — PROGRESS NOTES
No chief complaint on file.      The patient is here for follow-up of their side: right hip arthroplasty.  The patient has mild hip pain.  The patient has no mechanical symptoms.  The patient is approximately 3 months postop.  She also complains of left medial as well as right medial knee pain.  She has a known meniscus tear on the left.  We are planning on doing a knee arthroscopy and partial medial meniscectomy once her right hip felt better.  She complains of medial knee pain now along with catching and instability.  She feels as that the right knee is similar to the left knee.  She is using a cane.    Physical examination:    Examination of the side: right hip  Range of motion: Forward Flexion: 120 Internal Rotation: 20 External Rotation: 30 Abduction 30  The Patient can single leg stand and actively abduct  Calf is soft, Homans negative  The patient has intact ankle dorsiflexion and plantarflexion.  Right knee:  The skin is intact, the extensor mechanism is intact  There is a mild effusion, no erythema or warmth  Range of motion: 0-120 degrees  She has medial joint line pain  Gucci's is positive stressing the medial compartment  No instability varus or valgus stress knee at 0 30 degrees    Left knee:  She has medial joint line pain.  Gucci's is positive stressing the medial compartment    Radiographs:  See dictated report        Impression:  Status post side: right total hip arthroplasty  Left medial meniscus tear  Internal derangement right knee rule out medial meniscus tear    Plan:  Patient is on Eliquis for her pulmonary embolism  If she is able to come off the Eliquis 3 days then we could proceed with a left knee arthroscopy and partial medial meniscectomy.  If not we would have to wait until she can be off the Eliquis for 3 days.  We will check with her primary care  I would like to obtain an MRI of the right knee to rule out a medial meniscus tear on the right.  We will order this.  We discussed  arthroscopy versus nonsurgical treatment for the patient's meniscal tear.  We reviewed the blood supply to the meniscus, limited healing potential and meniscectomy versus meniscal repair.  The risks of the procedure were mentioned, including wound issues, deep venous thrombosis, pulmonary embolism and medical complications.  The anticipated timeframe for recovery and return to activity were outlined.  We discussed formal physical therapy versus home exercise program.     We mentioned the possibility of incomplete relief of pain, particularly if any chondral defects are encountered and the possibility of arthrosis of the knee related to long-term deficiencies of the meniscus.        Follow up in the MRI of the right knee or postoperatively for the left knee.  All questions answered        RX Allergies[1]    REVIEW OF SYSTEMS  General: Negative for malaise, significant weight loss, fever  Musculoskeletal: See HPI  Neuro:  Negative for numbness/tingling      Medication Documentation Review Audit       Reviewed by Victoria Nolan on 25 at 1153      Medication Order Taking? Sig Documenting Provider Last Dose Status   apixaban (Eliquis) 5 mg (74 tabs) tablet [id number]  Take 2 tablets (10 mg) by mouth 2 times a day for 7 days, THEN 1 tablet (5 mg) 2 times a day. SORAYA Roberson-CNP   25 2359   celecoxib (CeleBREX) 200 mg capsule [id number]  Take 1 capsule (200 mg) by mouth once daily. Rey Ivey MD  Active   cholecalciferol (Vitamin D-3) 50 mcg (2,000 unit) capsule [id number]  Take 1 capsule (50 mcg) by mouth once daily with breakfast. Historical Provider, MD  Active   cyclobenzaprine (Flexeril) 10 mg tablet [id number]  Take 1 tablet (10 mg) by mouth 3 times a day as needed for muscle spasms for up to 7 days. SORAYA Roberson-CNP   25 2359   Eliquis 5 mg tablet [id number] Yes Take 1 tablet by mouth two times a day. (START THIS AFTER STARTED PACK IS DONE) Historical Provider, MD   Active   escitalopram (Lexapro) 5 mg tablet [id number]  Take 1.5 tablets (7.5 mg) by mouth once daily. Historical Provider, MD  Active   fluticasone (Flonase) 50 mcg/actuation nasal spray [id number]  2 sprays by Does not apply route once daily.   Patient not taking: Reported on 2/26/2025    Historical Provider, MD  Active   levothyroxine (Synthroid, Levoxyl) 112 mcg tablet [id number]  Take 1 tablet (112 mcg) by mouth once daily. Historical Provider, MD  Active   metoclopramide (Reglan) 10 mg tablet [id number]  Take 1 tablet by mouth every 6 hours as needed (headaches) for up to 7 days.   Patient not taking: Reported on 2/12/2025    Historical Provider, MD  Active   multivitamin with minerals iron-free (Multivitamin 50 Plus) [id number]  Take 1 tablet by mouth once daily. Historical Provider, MD  Active   naloxone (Narcan) 4 mg/0.1 mL nasal spray [id number]  Instill 1 spray intranasally for opioid overdose; repeat in 5 minutes if no response.   Patient not taking: Reported on 2/26/2025    Panda Royal MD [facility]  Active   phenazopyridine (Pyridium) 100 mg tablet [id number] Yes Take 1 tablet (100 mg) by mouth every 8 hours if needed. Historical Provider, MD  Active                    HPI:  Left knee pain and mechanical symptoms MRI confirmation of posterior horn medial meniscus tear    PHYSICAL EXAM  General Appearance/Mental Status: A&Ox3, no apparent distress  HEENT: Normal  Lungs: Clear  Heart: RRR  Abdomen: Soft, nontender  Extremities: Abnormal left knee pain and mechanical symptoms, positive Gucci's test    Assessment:  Left knee medial meniscus tear    Plan:  Left knee arthroscopy and medial meniscectomy  Allergies to doxycycline and lactose  All medications reviewed, we will await recommendations from her vascular medicine physician about stopping Eliquis  All questions answered  Follow-up postoperatively         [1]   Allergies  Allergen Reactions    Doxycycline Itching     rash    Lactose GI Upset

## 2025-05-13 ENCOUNTER — APPOINTMENT (OUTPATIENT)
Dept: ORTHOPEDIC SURGERY | Facility: CLINIC | Age: 55
End: 2025-05-13
Payer: COMMERCIAL

## 2025-05-13 ENCOUNTER — HOSPITAL ENCOUNTER (OUTPATIENT)
Dept: RADIOLOGY | Facility: HOSPITAL | Age: 55
Discharge: HOME | End: 2025-05-13
Payer: COMMERCIAL

## 2025-05-13 DIAGNOSIS — Z96.641 STATUS POST TOTAL HIP REPLACEMENT, RIGHT: ICD-10-CM

## 2025-05-13 DIAGNOSIS — M23.91 INTERNAL DERANGEMENT OF RIGHT KNEE: ICD-10-CM

## 2025-05-13 DIAGNOSIS — Z96.641 STATUS POST RIGHT HIP REPLACEMENT: ICD-10-CM

## 2025-05-13 DIAGNOSIS — S83.242A TEAR OF MEDIAL MENISCUS OF LEFT KNEE, CURRENT, UNSPECIFIED TEAR TYPE, INITIAL ENCOUNTER: Primary | ICD-10-CM

## 2025-05-13 PROCEDURE — 73564 X-RAY EXAM KNEE 4 OR MORE: CPT | Mod: 50

## 2025-05-13 PROCEDURE — 1036F TOBACCO NON-USER: CPT | Performed by: ORTHOPAEDIC SURGERY

## 2025-05-13 PROCEDURE — 99214 OFFICE O/P EST MOD 30 MIN: CPT | Performed by: ORTHOPAEDIC SURGERY

## 2025-05-13 PROCEDURE — 99024 POSTOP FOLLOW-UP VISIT: CPT | Performed by: ORTHOPAEDIC SURGERY

## 2025-05-13 PROCEDURE — 73502 X-RAY EXAM HIP UNI 2-3 VIEWS: CPT | Mod: RIGHT SIDE | Performed by: RADIOLOGY

## 2025-05-13 PROCEDURE — 73502 X-RAY EXAM HIP UNI 2-3 VIEWS: CPT | Mod: RT

## 2025-05-13 RX ORDER — APIXABAN 5 MG/1
TABLET, FILM COATED ORAL
COMMUNITY
Start: 2025-05-07

## 2025-05-13 RX ORDER — PHENAZOPYRIDINE HYDROCHLORIDE 100 MG/1
100 TABLET, FILM COATED ORAL EVERY 8 HOURS PRN
COMMUNITY
Start: 2025-03-14

## 2025-05-20 ENCOUNTER — APPOINTMENT (OUTPATIENT)
Dept: ORTHOPEDIC SURGERY | Facility: CLINIC | Age: 55
End: 2025-05-20
Payer: COMMERCIAL

## 2025-05-21 ENCOUNTER — APPOINTMENT (OUTPATIENT)
Dept: PHYSICAL THERAPY | Facility: HOSPITAL | Age: 55
End: 2025-05-21
Payer: COMMERCIAL

## 2025-05-21 DIAGNOSIS — S83.249A MMT (MEDIAL MENISCUS TEAR): ICD-10-CM

## 2025-05-22 ENCOUNTER — APPOINTMENT (OUTPATIENT)
Dept: PHYSICAL THERAPY | Facility: HOSPITAL | Age: 55
End: 2025-05-22
Payer: COMMERCIAL

## 2025-06-03 ENCOUNTER — TELEPHONE (OUTPATIENT)
Dept: ORTHOPEDIC SURGERY | Facility: CLINIC | Age: 55
End: 2025-06-03
Payer: COMMERCIAL

## 2025-06-03 ENCOUNTER — APPOINTMENT (OUTPATIENT)
Dept: ORTHOPEDIC SURGERY | Facility: CLINIC | Age: 55
End: 2025-06-03
Payer: COMMERCIAL

## 2025-06-03 ENCOUNTER — TELEPHONE (OUTPATIENT)
Dept: ORTHOPEDIC SURGERY | Facility: CLINIC | Age: 55
End: 2025-06-03

## 2025-06-03 ENCOUNTER — LAB (OUTPATIENT)
Dept: LAB | Facility: HOSPITAL | Age: 55
End: 2025-06-03
Payer: COMMERCIAL

## 2025-06-03 DIAGNOSIS — S83.249A OTHER TEAR OF MEDIAL MENISCUS, CURRENT INJURY, UNSPECIFIED KNEE, INITIAL ENCOUNTER: Primary | ICD-10-CM

## 2025-06-03 DIAGNOSIS — M23.92 INTERNAL DERANGEMENT OF LEFT KNEE: ICD-10-CM

## 2025-06-03 LAB
BASOPHILS # BLD AUTO: 0.03 X10*3/UL (ref 0–0.1)
BASOPHILS NFR BLD AUTO: 0.4 %
EOSINOPHIL # BLD AUTO: 0.13 X10*3/UL (ref 0–0.7)
EOSINOPHIL NFR BLD AUTO: 1.8 %
ERYTHROCYTE [DISTWIDTH] IN BLOOD BY AUTOMATED COUNT: 12.5 % (ref 11.5–14.5)
HCT VFR BLD AUTO: 44.1 % (ref 36–46)
HGB BLD-MCNC: 14.1 G/DL (ref 12–16)
IMM GRANULOCYTES # BLD AUTO: 0.01 X10*3/UL (ref 0–0.7)
IMM GRANULOCYTES NFR BLD AUTO: 0.1 % (ref 0–0.9)
LYMPHOCYTES # BLD AUTO: 1.84 X10*3/UL (ref 1.2–4.8)
LYMPHOCYTES NFR BLD AUTO: 24.9 %
MCH RBC QN AUTO: 29 PG (ref 26–34)
MCHC RBC AUTO-ENTMCNC: 32 G/DL (ref 32–36)
MCV RBC AUTO: 91 FL (ref 80–100)
MONOCYTES # BLD AUTO: 0.36 X10*3/UL (ref 0.1–1)
MONOCYTES NFR BLD AUTO: 4.9 %
NEUTROPHILS # BLD AUTO: 5.01 X10*3/UL (ref 1.2–7.7)
NEUTROPHILS NFR BLD AUTO: 67.9 %
NRBC BLD-RTO: 0 /100 WBCS (ref 0–0)
PLATELET # BLD AUTO: 190 X10*3/UL (ref 150–450)
RBC # BLD AUTO: 4.86 X10*6/UL (ref 4–5.2)
WBC # BLD AUTO: 7.4 X10*3/UL (ref 4.4–11.3)

## 2025-06-03 PROCEDURE — 80053 COMPREHEN METABOLIC PANEL: CPT

## 2025-06-03 PROCEDURE — 36415 COLL VENOUS BLD VENIPUNCTURE: CPT

## 2025-06-03 PROCEDURE — 83036 HEMOGLOBIN GLYCOSYLATED A1C: CPT

## 2025-06-03 PROCEDURE — 85610 PROTHROMBIN TIME: CPT

## 2025-06-03 PROCEDURE — 85025 COMPLETE CBC W/AUTO DIFF WBC: CPT

## 2025-06-03 NOTE — TELEPHONE ENCOUNTER
LVM to PT about crutches needed for SX. If PT does not have a pair in [facility] is the [facility] #[phone] to schedule fitting appt.

## 2025-06-10 DIAGNOSIS — Z98.890 S/P LEFT KNEE ARTHROSCOPY: Primary | ICD-10-CM

## 2025-06-10 RX ORDER — HYDROCODONE BITARTRATE AND ACETAMINOPHEN 5; 325 MG/1; MG/1
1 TABLET ORAL EVERY 6 HOURS PRN
Qty: 28 TABLET | Refills: 0 | Status: SHIPPED | OUTPATIENT
Start: 2025-06-18 | End: 2025-06-25

## 2025-06-11 ENCOUNTER — APPOINTMENT (OUTPATIENT)
Dept: RADIOLOGY | Facility: HOSPITAL | Age: 55
End: 2025-06-11
Payer: COMMERCIAL

## 2025-06-11 DIAGNOSIS — M23.91 INTERNAL DERANGEMENT OF RIGHT KNEE: ICD-10-CM

## 2025-06-11 PROCEDURE — 73721 MRI JNT OF LWR EXTRE W/O DYE: CPT | Mod: RT

## 2025-06-18 ENCOUNTER — TELEPHONE (OUTPATIENT)
Dept: ORTHOPEDIC SURGERY | Facility: CLINIC | Age: 55
End: 2025-06-18
Payer: COMMERCIAL

## 2025-06-18 NOTE — TELEPHONE ENCOUNTER
Patient was called on this date and she was advised she can resume her Eliquis today and then stop this again as of 06-21-25 in preparation for her outpatient surgical procedure on 06-24-25.  She will resume the Eliquis on 06-24-25 after the procedure.  Patient stated understanding and had no further questions.

## 2025-06-24 ENCOUNTER — APPOINTMENT (OUTPATIENT)
Dept: ORTHOPEDIC SURGERY | Facility: CLINIC | Age: 55
End: 2025-06-24
Payer: COMMERCIAL

## 2025-06-30 ENCOUNTER — APPOINTMENT (OUTPATIENT)
Dept: ORTHOPEDIC SURGERY | Facility: CLINIC | Age: 55
End: 2025-06-30
Payer: COMMERCIAL

## 2025-06-30 DIAGNOSIS — Z98.890 STATUS POST ARTHROSCOPY OF LEFT KNEE: Primary | ICD-10-CM

## 2025-06-30 PROCEDURE — 99212 OFFICE O/P EST SF 10 MIN: CPT | Performed by: ORTHOPAEDIC SURGERY

## 2025-06-30 PROCEDURE — 1036F TOBACCO NON-USER: CPT | Performed by: ORTHOPAEDIC SURGERY

## 2025-06-30 PROCEDURE — 99024 POSTOP FOLLOW-UP VISIT: CPT | Performed by: ORTHOPAEDIC SURGERY

## 2025-06-30 NOTE — PROGRESS NOTES
No chief complaint on file.      The patient is 1 weeks status post knee arthroscopy.  Their pain is mild.    Physical examination:    The portal sites are healed.  There is mild effusion.  No erythema or warmth.  Knee range of motion 0 and 100  Calf is soft, Homans negative.    Impression: Status post side: left knee arthroscopy with partial medial meniscectomy    Plan:  Sutures removed  Intraoperative images reviewed with the patient  Physical therapy: No  Follow up in 3 weeks for her hip as well as knee.  We would need to get right hip films  All questions answered

## 2025-07-01 ENCOUNTER — TELEPHONE (OUTPATIENT)
Dept: ORTHOPEDIC SURGERY | Facility: CLINIC | Age: 55
End: 2025-07-01
Payer: COMMERCIAL

## 2025-07-01 NOTE — TELEPHONE ENCOUNTER
Patient called with concerns on her incision.  There is a pin hole in one of the portal sites.  She stated this was not draining.    Patient was advised to keep this open to the air, to apply a antibiotic ointment to the area and to call if this should change or start draining.    Patient stated understanding.

## 2025-07-15 DIAGNOSIS — Z96.641 STATUS POST TOTAL HIP REPLACEMENT, RIGHT: Primary | ICD-10-CM

## 2025-07-21 NOTE — PROGRESS NOTES
Chief Complaint   Patient presents with    Left Knee - Post-op     MMT   DOS: 6/24/25    Right Hip - Follow-up     ANNA 02-26-25       The patient is here for follow-up of their side: right direct anterior total hip arthroplasty.  The patient has mild hip pain.  The patient has mild mechanical symptoms.  The patient is approximately 4 months postop.  She is also 1 month status post left knee arthroscopy and medial meniscectomy and is doing well.  Unfortunately her total hip replacement was complicated by pulmonary embolism and she is on an anticoagulant medication and cannot take NSAIDs.  She is noting some pain and clicking sensations in the right groin when she is laying on her back and doing leg lifts.  She has also been noting some low back pain and states that her legs feel very heavy and floppy.    Physical examination:    Examination of the side: right hip  The incision is healing well  No erythema or warmth.  Range of motion: Forward Flexion: 120 Internal Rotation: 30 External Rotation: 30 Abduction 30  The Patient can single leg stand and actively abduct and actively flex at the hip  No instability appreciated with flexion, internal and external rotation, FADIR or KATIA testing.  No pain with this.  Calf is soft, Homans negative  The patient has intact ankle dorsiflexion and plantarflexion.    Radiographs:  See dictated report        Impression:  Status post side: right direct anterior total hip arthroplasty  Right hip flexor strain  Status post left knee arthroscopy and medial meniscectomy  Lumbar spine pain    Plan:  Medrol Dosepak  Discussed the continued importance of prophylactic dental antibiotics  Outpatient physical therapy  Follow up in 6 weeks.  For recheck  All questions answered

## 2025-07-22 ENCOUNTER — APPOINTMENT (OUTPATIENT)
Dept: ORTHOPEDIC SURGERY | Facility: CLINIC | Age: 55
End: 2025-07-22
Payer: COMMERCIAL

## 2025-07-22 ENCOUNTER — HOSPITAL ENCOUNTER (OUTPATIENT)
Dept: RADIOLOGY | Facility: HOSPITAL | Age: 55
Discharge: HOME | End: 2025-07-22
Payer: COMMERCIAL

## 2025-07-22 DIAGNOSIS — M54.50 LUMBAR SPINE PAIN: ICD-10-CM

## 2025-07-22 DIAGNOSIS — S76.011A STRAIN OF FLEXOR MUSCLE OF HIP, RIGHT, INITIAL ENCOUNTER: ICD-10-CM

## 2025-07-22 DIAGNOSIS — Z96.641 STATUS POST TOTAL HIP REPLACEMENT, RIGHT: ICD-10-CM

## 2025-07-22 DIAGNOSIS — Z98.890 STATUS POST ARTHROSCOPY OF LEFT KNEE: ICD-10-CM

## 2025-07-22 DIAGNOSIS — Z96.641 STATUS POST TOTAL HIP REPLACEMENT, RIGHT: Primary | ICD-10-CM

## 2025-07-22 PROCEDURE — 73502 X-RAY EXAM HIP UNI 2-3 VIEWS: CPT | Mod: RT

## 2025-07-22 PROCEDURE — 99213 OFFICE O/P EST LOW 20 MIN: CPT | Performed by: ORTHOPAEDIC SURGERY

## 2025-07-22 PROCEDURE — 73502 X-RAY EXAM HIP UNI 2-3 VIEWS: CPT | Mod: RIGHT SIDE | Performed by: RADIOLOGY

## 2025-07-22 RX ORDER — METHYLPREDNISOLONE 4 MG/1
TABLET ORAL
Qty: 21 TABLET | Refills: 0 | Status: SHIPPED | OUTPATIENT
Start: 2025-07-22

## 2025-07-25 ENCOUNTER — EVALUATION (OUTPATIENT)
Dept: PHYSICAL THERAPY | Facility: HOSPITAL | Age: 55
End: 2025-07-25
Payer: COMMERCIAL

## 2025-07-25 DIAGNOSIS — G89.29 CHRONIC PAIN OF LEFT KNEE: ICD-10-CM

## 2025-07-25 DIAGNOSIS — M25.562 CHRONIC PAIN OF LEFT KNEE: ICD-10-CM

## 2025-07-25 DIAGNOSIS — R29.898 WEAKNESS OF BOTH LOWER EXTREMITIES: ICD-10-CM

## 2025-07-25 DIAGNOSIS — S76.011D STRAIN OF FLEXOR MUSCLE OF HIP, RIGHT, SUBSEQUENT ENCOUNTER: Primary | ICD-10-CM

## 2025-07-25 PROCEDURE — 97110 THERAPEUTIC EXERCISES: CPT | Mod: GP | Performed by: PHYSICAL THERAPIST

## 2025-07-25 PROCEDURE — 97161 PT EVAL LOW COMPLEX 20 MIN: CPT | Mod: GP | Performed by: PHYSICAL THERAPIST

## 2025-07-25 ASSESSMENT — PAIN SCALES - GENERAL: PAINLEVEL_OUTOF10: 3

## 2025-07-25 ASSESSMENT — PAIN - FUNCTIONAL ASSESSMENT: PAIN_FUNCTIONAL_ASSESSMENT: 0-10

## 2025-07-25 NOTE — PROGRESS NOTES
"Physical Therapy    Physical Therapy Evaluation and Treatment      Patient Name: Cindy Deluna \"Bambi\"  MRN: [id number]  Today's Date: 7/25/2025    Time Entry:   Time Calculation  Start Time: 1130  Stop Time: 1215  Time Calculation (min): 45 min  PT Evaluation Time Entry  PT Evaluation (Low) Time Entry: 20  PT Therapeutic Procedures Time Entry  Therapeutic Exercise Time Entry: 25                   Assessment:  This 55 yo pleasant female is s/p Lt medial meniscectomy 6/24/25.  We saw her after Rt ANNA, anterior approach, she had 2 pulmonary embolism after her surgery and is on Eliquis.  No lower back pain.  She's walking into the clinic today with minimal antalgia.  She states she uses a st cane on stairs at home.  Her Lt knee AROM is WFL's and mild pain.  Decreased strength in her (B) LE's, Lt > Rt.  She continues with Rt hip flexor tightness which is normal secondary to having anterior hip.  Decreased gait and overall function.  Some difficulty noted with her instrumental ADL's and work activities around the house.  Pt given HEP and all questions answered.        Plan:  Continue with skilled PT      Current Problem:   1. Strain of flexor muscle of hip, right, subsequent encounter  Follow Up In Physical Therapy      2. Weakness of both lower extremities  Follow Up In Physical Therapy      3. Chronic pain of left knee  Follow Up In Physical Therapy          Subjective  She reports mild Lt knee pain and Rt hip flexor tightness and snapping at times.      Pain:  Pain Assessment  Pain Assessment: 0-10  0-10 (Numeric) Pain Score: 3  Pain Type: Chronic pain  Pain Location: Knee  Pain Orientation: Left    Objective     AROM:  Lt knee AROM WFL's noting mild painful motion    Rt hip flexor tightness    Strength:  Lt knee/LE 4/5.   Rt hip/LE 4+/5 gross strength    Posture:  No edema Lt knee    Palpation:  Mild tenderness to Lt medial joint line.      Functional Mobility:  Independent    Balance:  Good standing and walking " balance NT    Special Tests:l    Outcome Measures:  Other Measures  Lower Extremity Funtional Score (LEFS): 41/80     Treatments:    Rt hip flexor stretches (3-way) 30 sec hold, 3 reps *    Lt heel slides, 30 reps *  SLR's x 4, 20 reps *  LAQ's, 20 reps *    NuStep, L3, 7 minutes      Goals:    0/10 Lt knee pain.  Lt knee AROM pain free.   Rt/Lt knee/LE 5/5 strength grossly throughout.  Minimal antalgia to normal gait pattern with least restrictive device.  Pt walk up and down a flight of stairs with 1 rail with reciprocal gait pattern.  No difficulty or pain performing basic and instrumental ADL's and work activities around the house.  Independent with HEP.

## 2025-07-31 ENCOUNTER — TELEPHONE (OUTPATIENT)
Dept: PHYSICAL THERAPY | Facility: CLINIC | Age: 55
End: 2025-07-31
Payer: COMMERCIAL

## 2025-07-31 NOTE — TELEPHONE ENCOUNTER
Called and LVM for patient on c/x appt due to the provider being out of the office..  Sent my chart message as well

## 2025-09-02 ENCOUNTER — APPOINTMENT (OUTPATIENT)
Dept: ORTHOPEDIC SURGERY | Facility: CLINIC | Age: 55
End: 2025-09-02
Payer: COMMERCIAL

## 2026-02-03 ENCOUNTER — APPOINTMENT (OUTPATIENT)
Dept: ORTHOPEDIC SURGERY | Facility: CLINIC | Age: 56
End: 2026-02-03
Payer: COMMERCIAL

## (undated) DEVICE — SOLUTION, INJECTION, USP, SODIUM CHLORIDE 0.9%, .9, NACL, 1000 ML, BAG

## (undated) DEVICE — DRESSING, MEPILEX BORDER, POST-OP AG, 4 X 12 IN

## (undated) DEVICE — DRAPE, SHEET, U, W/ADHESIVE STRIP, IMPERVIOUS, 60 X 70 IN, DISPOSABLE, LF, STERILE

## (undated) DEVICE — MAT, FLOOR, STANDARD FLUID BARRIER, 32X44, GREEN

## (undated) DEVICE — GLOVE, SURGICAL, PROTEXIS PI , 8.5, PF, LF

## (undated) DEVICE — GOWN, SURGICAL, ROYAL SILK, XXL, STERILE

## (undated) DEVICE — COVER, FOOT PEDAL, HANA

## (undated) DEVICE — GLOVE, SURGICAL, PROTEXIS PI , 7.5, PF, LF

## (undated) DEVICE — PAD, POST, PERINEAL, ADULT

## (undated) DEVICE — TOWEL PACK, STERILE, 16X24, XRAY DETECTABLE, BLUE, 4/PK

## (undated) DEVICE — STRAP, VELCRO, BODY, 4 X 60IN, NS

## (undated) DEVICE — MANIFOLD, 4 PORT NEPTUNE STANDARD

## (undated) DEVICE — SUTURE, ETHIBOND XTRA, 1, 30 IN, CTX, LF

## (undated) DEVICE — STRAP, ARM BOARD, 32 X 1.5

## (undated) DEVICE — SOLUTION, IRRIGATION, USP, SODIUM CHLORIDE 0.9%, 3000 ML

## (undated) DEVICE — HOOD, SURGICAL, FLYTE, T7

## (undated) DEVICE — CAUTERY, PENCIL, PUSH BUTTON, SMOKE EVAC, 70MM

## (undated) DEVICE — PROTECTOR, NERVE, ULNAR, PINK

## (undated) DEVICE — SEALER, BIPOLAR, AQUA MANTYS 6.0

## (undated) DEVICE — TUBING, IRRIGATION, HIGH FLOW, HAND PIECE SET

## (undated) DEVICE — GLOVE, SURGICAL, PROTEXIS PI BLUE W/NEUTHERA, 7.5, PF, LF

## (undated) DEVICE — Device

## (undated) DEVICE — WOUND SYSTEM, DEBRIDEMENT & CLEANING, O.R DUOPAK

## (undated) DEVICE — APPLICATOR, CHLORAPREP, W/ORANGE TINT, 26ML

## (undated) DEVICE — ADHESIVE, SKIN, DERMABOND ADVANCED, 15CM, PEN-STYLE

## (undated) DEVICE — SUTURE, VICRYL, 1, 36 IN, V-34, VIOLET

## (undated) DEVICE — ELECTRODE, ELECTROSURGICAL, BLADE, EXTENDED

## (undated) DEVICE — SUTURE, MONOCRYL, 4-0, 27 IN, PS-2, UNDYED

## (undated) DEVICE — BLADE, SAW, SAGITTAL, 21 X 84.5 X 0.89 MM, STAINLESS STEEL, STERILE

## (undated) DEVICE — DRAPE, C-ARM IMAGE